# Patient Record
Sex: FEMALE | Race: WHITE | Employment: OTHER | URBAN - METROPOLITAN AREA
[De-identification: names, ages, dates, MRNs, and addresses within clinical notes are randomized per-mention and may not be internally consistent; named-entity substitution may affect disease eponyms.]

---

## 2017-01-01 ENCOUNTER — APPOINTMENT (OUTPATIENT)
Dept: ULTRASOUND IMAGING | Facility: HOSPITAL | Age: 82
DRG: 280 | End: 2017-01-01
Attending: INTERNAL MEDICINE
Payer: MEDICARE

## 2017-01-01 ENCOUNTER — APPOINTMENT (OUTPATIENT)
Dept: CV DIAGNOSTICS | Facility: HOSPITAL | Age: 82
DRG: 872 | End: 2017-01-01
Attending: INTERNAL MEDICINE
Payer: MEDICARE

## 2017-01-01 ENCOUNTER — APPOINTMENT (OUTPATIENT)
Dept: GENERAL RADIOLOGY | Facility: HOSPITAL | Age: 82
DRG: 872 | End: 2017-01-01
Attending: INTERNAL MEDICINE
Payer: MEDICARE

## 2017-01-01 ENCOUNTER — APPOINTMENT (OUTPATIENT)
Dept: GENERAL RADIOLOGY | Facility: HOSPITAL | Age: 82
DRG: 280 | End: 2017-01-01
Attending: INTERNAL MEDICINE
Payer: MEDICARE

## 2017-01-01 ENCOUNTER — APPOINTMENT (OUTPATIENT)
Dept: ULTRASOUND IMAGING | Facility: HOSPITAL | Age: 82
DRG: 314 | End: 2017-01-01
Attending: INTERNAL MEDICINE
Payer: MEDICARE

## 2017-01-01 ENCOUNTER — APPOINTMENT (OUTPATIENT)
Dept: CT IMAGING | Facility: HOSPITAL | Age: 82
DRG: 314 | End: 2017-01-01
Attending: EMERGENCY MEDICINE
Payer: MEDICARE

## 2017-01-01 ENCOUNTER — LAB REQUISITION (OUTPATIENT)
Dept: LAB | Facility: HOSPITAL | Age: 82
DRG: 280 | End: 2017-01-01
Payer: MEDICARE

## 2017-01-01 ENCOUNTER — APPOINTMENT (OUTPATIENT)
Dept: GENERAL RADIOLOGY | Facility: HOSPITAL | Age: 82
DRG: 314 | End: 2017-01-01
Attending: EMERGENCY MEDICINE
Payer: MEDICARE

## 2017-01-01 ENCOUNTER — APPOINTMENT (OUTPATIENT)
Dept: ULTRASOUND IMAGING | Facility: HOSPITAL | Age: 82
DRG: 872 | End: 2017-01-01
Attending: PHYSICIAN ASSISTANT
Payer: MEDICARE

## 2017-01-01 ENCOUNTER — LAB REQUISITION (OUTPATIENT)
Dept: LAB | Facility: HOSPITAL | Age: 82
DRG: 872 | End: 2017-01-01
Attending: INTERNAL MEDICINE
Payer: MEDICARE

## 2017-01-01 ENCOUNTER — HOSPITAL ENCOUNTER (INPATIENT)
Facility: HOSPITAL | Age: 82
LOS: 8 days | Discharge: NURSING, REHAB OR LONG-TERM CARE | DRG: 872 | End: 2017-01-01
Attending: EMERGENCY MEDICINE | Admitting: INTERNAL MEDICINE
Payer: MEDICARE

## 2017-01-01 ENCOUNTER — SNF VISIT (OUTPATIENT)
Dept: INTERNAL MEDICINE CLINIC | Age: 82
End: 2017-01-01

## 2017-01-01 ENCOUNTER — APPOINTMENT (OUTPATIENT)
Dept: GENERAL RADIOLOGY | Facility: HOSPITAL | Age: 82
DRG: 872 | End: 2017-01-01
Attending: HOSPITALIST
Payer: MEDICARE

## 2017-01-01 ENCOUNTER — APPOINTMENT (OUTPATIENT)
Dept: GENERAL RADIOLOGY | Facility: HOSPITAL | Age: 82
DRG: 280 | End: 2017-01-01
Payer: MEDICARE

## 2017-01-01 ENCOUNTER — APPOINTMENT (OUTPATIENT)
Dept: GENERAL RADIOLOGY | Facility: HOSPITAL | Age: 82
DRG: 280 | End: 2017-01-01
Attending: RADIOLOGY
Payer: MEDICARE

## 2017-01-01 ENCOUNTER — HOSPITAL ENCOUNTER (INPATIENT)
Facility: HOSPITAL | Age: 82
LOS: 7 days | Discharge: NURSING, REHAB OR LONG-TERM CARE | DRG: 314 | End: 2017-01-01
Attending: EMERGENCY MEDICINE | Admitting: HOSPITALIST
Payer: MEDICARE

## 2017-01-01 ENCOUNTER — HOSPITAL ENCOUNTER (INPATIENT)
Facility: HOSPITAL | Age: 82
LOS: 6 days | Discharge: NURSING, REHAB OR LONG-TERM CARE | DRG: 280 | End: 2017-01-01
Attending: INTERNAL MEDICINE | Admitting: INTERNAL MEDICINE
Payer: MEDICARE

## 2017-01-01 ENCOUNTER — APPOINTMENT (OUTPATIENT)
Dept: GENERAL RADIOLOGY | Facility: HOSPITAL | Age: 82
DRG: 872 | End: 2017-01-01
Attending: EMERGENCY MEDICINE
Payer: MEDICARE

## 2017-01-01 ENCOUNTER — APPOINTMENT (OUTPATIENT)
Dept: ULTRASOUND IMAGING | Facility: HOSPITAL | Age: 82
DRG: 872 | End: 2017-01-01
Attending: INTERNAL MEDICINE
Payer: MEDICARE

## 2017-01-01 ENCOUNTER — APPOINTMENT (OUTPATIENT)
Dept: CV DIAGNOSTICS | Facility: HOSPITAL | Age: 82
DRG: 314 | End: 2017-01-01
Attending: INTERNAL MEDICINE
Payer: MEDICARE

## 2017-01-01 ENCOUNTER — APPOINTMENT (OUTPATIENT)
Dept: CT IMAGING | Facility: HOSPITAL | Age: 82
DRG: 872 | End: 2017-01-01
Attending: EMERGENCY MEDICINE
Payer: MEDICARE

## 2017-01-01 VITALS
WEIGHT: 108.69 LBS | HEART RATE: 75 BPM | DIASTOLIC BLOOD PRESSURE: 75 MMHG | RESPIRATION RATE: 22 BRPM | TEMPERATURE: 98 F | SYSTOLIC BLOOD PRESSURE: 145 MMHG | OXYGEN SATURATION: 97 % | BODY MASS INDEX: 20 KG/M2

## 2017-01-01 VITALS
DIASTOLIC BLOOD PRESSURE: 67 MMHG | OXYGEN SATURATION: 96 % | TEMPERATURE: 97 F | RESPIRATION RATE: 22 BRPM | HEART RATE: 86 BPM | SYSTOLIC BLOOD PRESSURE: 148 MMHG

## 2017-01-01 VITALS
DIASTOLIC BLOOD PRESSURE: 82 MMHG | OXYGEN SATURATION: 95 % | SYSTOLIC BLOOD PRESSURE: 155 MMHG | RESPIRATION RATE: 20 BRPM | HEART RATE: 98 BPM | TEMPERATURE: 98 F

## 2017-01-01 VITALS
OXYGEN SATURATION: 97 % | BODY MASS INDEX: 20.94 KG/M2 | DIASTOLIC BLOOD PRESSURE: 82 MMHG | TEMPERATURE: 98 F | HEIGHT: 62 IN | SYSTOLIC BLOOD PRESSURE: 149 MMHG | WEIGHT: 113.81 LBS | RESPIRATION RATE: 18 BRPM | HEART RATE: 80 BPM

## 2017-01-01 VITALS
DIASTOLIC BLOOD PRESSURE: 52 MMHG | SYSTOLIC BLOOD PRESSURE: 87 MMHG | RESPIRATION RATE: 24 BRPM | OXYGEN SATURATION: 92 % | HEART RATE: 100 BPM

## 2017-01-01 VITALS
OXYGEN SATURATION: 98 % | WEIGHT: 108.63 LBS | BODY MASS INDEX: 20 KG/M2 | TEMPERATURE: 98 F | HEART RATE: 85 BPM | RESPIRATION RATE: 20 BRPM | DIASTOLIC BLOOD PRESSURE: 31 MMHG | SYSTOLIC BLOOD PRESSURE: 96 MMHG

## 2017-01-01 VITALS
WEIGHT: 120.38 LBS | TEMPERATURE: 98 F | RESPIRATION RATE: 18 BRPM | HEART RATE: 84 BPM | DIASTOLIC BLOOD PRESSURE: 67 MMHG | BODY MASS INDEX: 22.15 KG/M2 | SYSTOLIC BLOOD PRESSURE: 155 MMHG | HEIGHT: 62 IN | OXYGEN SATURATION: 99 %

## 2017-01-01 DIAGNOSIS — F03.91 DEMENTIA WITH BEHAVIORAL DISTURBANCE (HCC): ICD-10-CM

## 2017-01-01 DIAGNOSIS — I50.9 ACUTE ON CHRONIC CONGESTIVE HEART FAILURE, UNSPECIFIED CONGESTIVE HEART FAILURE TYPE: ICD-10-CM

## 2017-01-01 DIAGNOSIS — E87.6 HYPOKALEMIA: ICD-10-CM

## 2017-01-01 DIAGNOSIS — R06.02 SHORTNESS OF BREATH: ICD-10-CM

## 2017-01-01 DIAGNOSIS — R50.9 FEVER, UNSPECIFIED FEVER CAUSE: ICD-10-CM

## 2017-01-01 DIAGNOSIS — R13.19 OTHER DYSPHAGIA: ICD-10-CM

## 2017-01-01 DIAGNOSIS — I10 ESSENTIAL HYPERTENSION WITH GOAL BLOOD PRESSURE LESS THAN 140/90: ICD-10-CM

## 2017-01-01 DIAGNOSIS — I10 ESSENTIAL HYPERTENSION: ICD-10-CM

## 2017-01-01 DIAGNOSIS — I25.10 CORONARY ARTERY DISEASE INVOLVING NATIVE CORONARY ARTERY OF NATIVE HEART WITHOUT ANGINA PECTORIS: ICD-10-CM

## 2017-01-01 DIAGNOSIS — F03.90 DEMENTIA WITHOUT BEHAVIORAL DISTURBANCE, UNSPECIFIED DEMENTIA TYPE (HCC): ICD-10-CM

## 2017-01-01 DIAGNOSIS — I95.9 HYPOTENSION, UNSPECIFIED HYPOTENSION TYPE: ICD-10-CM

## 2017-01-01 DIAGNOSIS — I50.9 ACUTE ON CHRONIC CONGESTIVE HEART FAILURE, UNSPECIFIED CONGESTIVE HEART FAILURE TYPE: Primary | ICD-10-CM

## 2017-01-01 DIAGNOSIS — I10 ESSENTIAL (PRIMARY) HYPERTENSION: ICD-10-CM

## 2017-01-01 DIAGNOSIS — E78.2 HYPERLIPIDEMIA, MIXED: ICD-10-CM

## 2017-01-01 DIAGNOSIS — R78.81 BACTEREMIA: ICD-10-CM

## 2017-01-01 DIAGNOSIS — R13.10 DYSPHAGIA, UNSPECIFIED TYPE: ICD-10-CM

## 2017-01-01 DIAGNOSIS — R45.1 AGITATION: ICD-10-CM

## 2017-01-01 DIAGNOSIS — F03.91 DEMENTIA WITH BEHAVIORAL DISTURBANCE, UNSPECIFIED DEMENTIA TYPE (HCC): ICD-10-CM

## 2017-01-01 DIAGNOSIS — R06.00 DYSPNEA, UNSPECIFIED TYPE: ICD-10-CM

## 2017-01-01 DIAGNOSIS — E83.42 HYPOMAGNESEMIA: ICD-10-CM

## 2017-01-01 DIAGNOSIS — E78.5 HYPERLIPIDEMIA: ICD-10-CM

## 2017-01-01 DIAGNOSIS — R94.31 ABNORMAL EKG: ICD-10-CM

## 2017-01-01 DIAGNOSIS — B95.5 BACTEREMIA DUE TO STREPTOCOCCUS: ICD-10-CM

## 2017-01-01 DIAGNOSIS — R77.8 ELEVATED TROPONIN: ICD-10-CM

## 2017-01-01 DIAGNOSIS — R53.1 WEAKNESS: ICD-10-CM

## 2017-01-01 DIAGNOSIS — Z95.0 PACEMAKER: ICD-10-CM

## 2017-01-01 DIAGNOSIS — R06.03 RESPIRATORY DISTRESS: ICD-10-CM

## 2017-01-01 DIAGNOSIS — R78.81 BACTEREMIA DUE TO STREPTOCOCCUS: ICD-10-CM

## 2017-01-01 DIAGNOSIS — R40.0 SOMNOLENCE: Primary | ICD-10-CM

## 2017-01-01 DIAGNOSIS — J96.00 ACUTE RESPIRATORY FAILURE, UNSPECIFIED WHETHER WITH HYPOXIA OR HYPERCAPNIA (HCC): Primary | ICD-10-CM

## 2017-01-01 DIAGNOSIS — Z91.81 AT RISK FOR FALLING: ICD-10-CM

## 2017-01-01 DIAGNOSIS — R06.89 BREATHING DIFFICULTY: Primary | ICD-10-CM

## 2017-01-01 DIAGNOSIS — E87.1 HYPONATREMIA: ICD-10-CM

## 2017-01-01 DIAGNOSIS — R07.89 CHEST TIGHTNESS: Primary | ICD-10-CM

## 2017-01-01 DIAGNOSIS — R60.0 LOCALIZED EDEMA: ICD-10-CM

## 2017-01-01 DIAGNOSIS — E86.0 DEHYDRATION: ICD-10-CM

## 2017-01-01 DIAGNOSIS — I35.0 AORTIC VALVE STENOSIS, UNSPECIFIED ETIOLOGY: ICD-10-CM

## 2017-01-01 DIAGNOSIS — R63.0 ANOREXIA: ICD-10-CM

## 2017-01-01 DIAGNOSIS — E55.9 VITAMIN D DEFICIENCY: ICD-10-CM

## 2017-01-01 DIAGNOSIS — I95.9 HYPOTENSION, UNSPECIFIED HYPOTENSION TYPE: Primary | ICD-10-CM

## 2017-01-01 DIAGNOSIS — Z95.0 PRESENCE OF CARDIAC PACEMAKER: ICD-10-CM

## 2017-01-01 DIAGNOSIS — R60.0 PEDAL EDEMA: ICD-10-CM

## 2017-01-01 LAB
ALBUMIN SERPL-MCNC: 2.1 G/DL (ref 3.5–4.8)
ALBUMIN SERPL-MCNC: 2.1 G/DL (ref 3.5–4.8)
ALBUMIN SERPL-MCNC: 2.2 G/DL (ref 3.5–4.8)
ALBUMIN SERPL-MCNC: 2.8 G/DL (ref 3.5–4.8)
ALLENS TEST: POSITIVE
ALLENS TEST: POSITIVE
ALP LIVER SERPL-CCNC: 71 U/L (ref 55–142)
ALP LIVER SERPL-CCNC: 76 U/L (ref 55–142)
ALP LIVER SERPL-CCNC: 87 U/L (ref 55–142)
ALP LIVER SERPL-CCNC: 95 U/L (ref 55–142)
ALT SERPL-CCNC: 117 U/L (ref 14–54)
ALT SERPL-CCNC: 15 U/L (ref 14–54)
ALT SERPL-CCNC: 38 U/L (ref 14–54)
ALT SERPL-CCNC: 63 U/L (ref 14–54)
APTT PPP: 23.3 SECONDS (ref 25–34)
ARTERIAL BLD GAS O2 SATURATION: 96 % (ref 92–100)
ARTERIAL BLD GAS O2 SATURATION: 96 % (ref 92–100)
ARTERIAL BLOOD GAS BASE EXCESS: -2.6
ARTERIAL BLOOD GAS BASE EXCESS: 0.2
ARTERIAL BLOOD GAS HCO3: 21.8 MEQ/L (ref 22–26)
ARTERIAL BLOOD GAS HCO3: 23.2 MEQ/L (ref 22–26)
ARTERIAL BLOOD GAS PCO2: 31 MM HG (ref 35–45)
ARTERIAL BLOOD GAS PCO2: 35 MM HG (ref 35–45)
ARTERIAL BLOOD GAS PH: 7.41 (ref 7.35–7.45)
ARTERIAL BLOOD GAS PH: 7.5 (ref 7.35–7.45)
ARTERIAL BLOOD GAS PO2: 110 MM HG (ref 80–105)
ARTERIAL BLOOD GAS PO2: 88 MM HG (ref 80–105)
AST SERPL-CCNC: 18 U/L (ref 15–41)
AST SERPL-CCNC: 20 U/L (ref 15–41)
AST SERPL-CCNC: 27 U/L (ref 15–41)
AST SERPL-CCNC: 89 U/L (ref 15–41)
ATRIAL RATE: 88 BPM
BASOPHIL PLEURAL FLUID: 0 %
BASOPHILS # BLD AUTO: 0.02 X10(3) UL (ref 0–0.1)
BASOPHILS # BLD AUTO: 0.02 X10(3) UL (ref 0–0.1)
BASOPHILS # BLD AUTO: 0.03 X10(3) UL (ref 0–0.1)
BASOPHILS # BLD AUTO: 0.05 X10(3) UL (ref 0–0.1)
BASOPHILS NFR BLD AUTO: 0.1 %
BASOPHILS NFR BLD AUTO: 0.2 %
BASOPHILS NFR BLD AUTO: 0.2 %
BASOPHILS NFR BLD AUTO: 0.3 %
BETA NATRIURETIC PEPTIDE: 2375 PG/ML (ref 2–99)
BILIRUB SERPL-MCNC: 0.3 MG/DL (ref 0.1–2)
BILIRUB SERPL-MCNC: 0.4 MG/DL (ref 0.1–2)
BILIRUB SERPL-MCNC: 0.4 MG/DL (ref 0.1–2)
BILIRUB SERPL-MCNC: 1 MG/DL (ref 0.1–2)
BILIRUB UR QL STRIP.AUTO: NEGATIVE
BUN BLD-MCNC: 10 MG/DL (ref 8–20)
BUN BLD-MCNC: 13 MG/DL (ref 8–20)
BUN BLD-MCNC: 20 MG/DL (ref 8–20)
BUN BLD-MCNC: 22 MG/DL (ref 8–20)
BUN BLD-MCNC: 23 MG/DL (ref 8–20)
BUN BLD-MCNC: 24 MG/DL (ref 8–20)
BUN BLD-MCNC: 25 MG/DL (ref 8–20)
BUN BLD-MCNC: 27 MG/DL (ref 8–20)
BUN BLD-MCNC: 30 MG/DL (ref 8–20)
BUN BLD-MCNC: 31 MG/DL (ref 8–20)
CALCIUM BLD-MCNC: 7.9 MG/DL (ref 8.3–10.3)
CALCIUM BLD-MCNC: 8 MG/DL (ref 8.3–10.3)
CALCIUM BLD-MCNC: 8.3 MG/DL (ref 8.3–10.3)
CALCIUM BLD-MCNC: 8.3 MG/DL (ref 8.3–10.3)
CALCIUM BLD-MCNC: 8.4 MG/DL (ref 8.3–10.3)
CALCIUM BLD-MCNC: 8.5 MG/DL (ref 8.3–10.3)
CALCIUM BLD-MCNC: 8.5 MG/DL (ref 8.3–10.3)
CALCIUM BLD-MCNC: 8.6 MG/DL (ref 8.3–10.3)
CALCIUM BLD-MCNC: 8.7 MG/DL (ref 8.3–10.3)
CALCIUM BLD-MCNC: 8.7 MG/DL (ref 8.3–10.3)
CALCIUM BLD-MCNC: 8.9 MG/DL (ref 8.3–10.3)
CALCULATED O2 SATURATION: 98 % (ref 92–100)
CALCULATED O2 SATURATION: 98 % (ref 92–100)
CARBOXYHEMOGLOBIN: 1.4 % SAT (ref 0–3)
CARBOXYHEMOGLOBIN: 1.6 % SAT (ref 0–3)
CHLORIDE: 100 MMOL/L (ref 101–111)
CHLORIDE: 100 MMOL/L (ref 101–111)
CHLORIDE: 102 MMOL/L (ref 101–111)
CHLORIDE: 103 MMOL/L (ref 101–111)
CHLORIDE: 104 MMOL/L (ref 101–111)
CHLORIDE: 106 MMOL/L (ref 101–111)
CHLORIDE: 107 MMOL/L (ref 101–111)
CHLORIDE: 107 MMOL/L (ref 101–111)
CLARITY UR REFRACT.AUTO: CLEAR
CO2: 21 MMOL/L (ref 22–32)
CO2: 23 MMOL/L (ref 22–32)
CO2: 24 MMOL/L (ref 22–32)
CO2: 25 MMOL/L (ref 22–32)
CO2: 29 MMOL/L (ref 22–32)
CO2: 32 MMOL/L (ref 22–32)
CO2: 32 MMOL/L (ref 22–32)
CO2: 33 MMOL/L (ref 22–32)
CO2: 33 MMOL/L (ref 22–32)
COLOR UR AUTO: YELLOW
CREAT BLD-MCNC: 0.5 MG/DL (ref 0.55–1.02)
CREAT BLD-MCNC: 0.57 MG/DL (ref 0.55–1.02)
CREAT BLD-MCNC: 0.6 MG/DL (ref 0.55–1.02)
CREAT BLD-MCNC: 0.72 MG/DL (ref 0.55–1.02)
CREAT BLD-MCNC: 0.8 MG/DL (ref 0.55–1.02)
CREAT BLD-MCNC: 0.86 MG/DL (ref 0.55–1.02)
CREAT BLD-MCNC: 0.88 MG/DL (ref 0.55–1.02)
CREAT BLD-MCNC: 0.89 MG/DL (ref 0.55–1.02)
CREAT BLD-MCNC: 0.89 MG/DL (ref 0.55–1.02)
CREAT BLD-MCNC: 0.92 MG/DL (ref 0.55–1.02)
CREAT BLD-MCNC: 0.95 MG/DL (ref 0.55–1.02)
CREAT BLD-MCNC: 0.96 MG/DL (ref 0.55–1.02)
CREAT BLD-MCNC: 0.96 MG/DL (ref 0.55–1.02)
EOSINOPHIL # BLD AUTO: 0 X10(3) UL (ref 0–0.3)
EOSINOPHIL # BLD AUTO: 0 X10(3) UL (ref 0–0.3)
EOSINOPHIL # BLD AUTO: 0.01 X10(3) UL (ref 0–0.3)
EOSINOPHIL # BLD AUTO: 0.08 X10(3) UL (ref 0–0.3)
EOSINOPHIL NFR BLD AUTO: 0 %
EOSINOPHIL NFR BLD AUTO: 0 %
EOSINOPHIL NFR BLD AUTO: 0.1 %
EOSINOPHIL NFR BLD AUTO: 0.5 %
EOSINOPHIL PLEURAL FLUID: 0 %
ERYTHROCYTE [DISTWIDTH] IN BLOOD BY AUTOMATED COUNT: 13 % (ref 11.5–16)
ERYTHROCYTE [DISTWIDTH] IN BLOOD BY AUTOMATED COUNT: 13.1 % (ref 11.5–16)
ERYTHROCYTE [DISTWIDTH] IN BLOOD BY AUTOMATED COUNT: 13.2 % (ref 11.5–16)
ERYTHROCYTE [DISTWIDTH] IN BLOOD BY AUTOMATED COUNT: 14 % (ref 11.5–16)
ERYTHROCYTE [DISTWIDTH] IN BLOOD BY AUTOMATED COUNT: 14.2 % (ref 11.5–16)
ERYTHROCYTE [DISTWIDTH] IN BLOOD BY AUTOMATED COUNT: 14.4 % (ref 11.5–16)
EXPIRATORY PRESSURE: 5 CM H2O
EXPIRATORY PRESSURE: 6 CM H2O
FIO2: 40 %
GENTAMICIN PEAK: 3.3 UG/ML (ref 5–10)
GLUCOSE BLD-MCNC: 100 MG/DL (ref 70–99)
GLUCOSE BLD-MCNC: 119 MG/DL (ref 70–99)
GLUCOSE BLD-MCNC: 124 MG/DL (ref 70–99)
GLUCOSE BLD-MCNC: 135 MG/DL (ref 70–99)
GLUCOSE BLD-MCNC: 160 MG/DL (ref 70–99)
GLUCOSE BLD-MCNC: 172 MG/DL (ref 70–99)
GLUCOSE BLD-MCNC: 87 MG/DL (ref 70–99)
GLUCOSE BLD-MCNC: 93 MG/DL (ref 70–99)
GLUCOSE BLD-MCNC: 94 MG/DL (ref 70–99)
GLUCOSE BLD-MCNC: 95 MG/DL (ref 70–99)
GLUCOSE BLD-MCNC: 96 MG/DL (ref 70–99)
GLUCOSE BLD-MCNC: 98 MG/DL (ref 70–99)
GLUCOSE BLD-MCNC: 99 MG/DL (ref 70–99)
GLUCOSE PLEURAL FLUID: 128 MG/DL
GLUCOSE PLEURAL FLUID: 129 MG/DL
GLUCOSE UR STRIP.AUTO-MCNC: NEGATIVE MG/DL
HAV IGM SER QL: 2 MG/DL (ref 1.7–3)
HAV IGM SER QL: 2.1 MG/DL (ref 1.7–3)
HCT VFR BLD AUTO: 29.8 % (ref 34–50)
HCT VFR BLD AUTO: 31.5 % (ref 34–50)
HCT VFR BLD AUTO: 31.8 % (ref 34–50)
HCT VFR BLD AUTO: 32.2 % (ref 34–50)
HCT VFR BLD AUTO: 36.1 % (ref 34–50)
HCT VFR BLD AUTO: 36.9 % (ref 34–50)
HGB BLD-MCNC: 10.3 G/DL (ref 12–16)
HGB BLD-MCNC: 10.5 G/DL (ref 12–16)
HGB BLD-MCNC: 10.6 G/DL (ref 12–16)
HGB BLD-MCNC: 11.7 G/DL (ref 12–16)
HGB BLD-MCNC: 12.5 G/DL (ref 12–16)
HGB BLD-MCNC: 9.8 G/DL (ref 12–16)
IMMATURE GRANULOCYTE COUNT: 0.06 X10(3) UL (ref 0–1)
IMMATURE GRANULOCYTE COUNT: 0.09 X10(3) UL (ref 0–1)
IMMATURE GRANULOCYTE COUNT: 0.1 X10(3) UL (ref 0–1)
IMMATURE GRANULOCYTE COUNT: 0.15 X10(3) UL (ref 0–1)
IMMATURE GRANULOCYTE RATIO %: 0.5 %
IMMATURE GRANULOCYTE RATIO %: 0.5 %
IMMATURE GRANULOCYTE RATIO %: 0.7 %
IMMATURE GRANULOCYTE RATIO %: 0.9 %
INR BLD: 1.26 (ref 0.89–1.11)
INR BLD: 1.44 (ref 0.89–1.11)
INSP PRESSURE: 12 CM H2O
INSP PRESSURE: 14 CM H2O
IONIZED CALCIUM: 1.13 MMOL/L (ref 1.12–1.32)
KETONES UR STRIP.AUTO-MCNC: NEGATIVE MG/DL
L/M: 50 L/MIN
LACTIC ACID ARTERIAL: 1.5 MMOL/L (ref 0.5–2)
LACTIC ACID: 1.5 MMOL/L (ref 0.5–2)
LDH PLEURAL FLUID: 80 U/L
LDH PLEURAL FLUID: 99 U/L
LEUKOCYTE ESTERASE UR QL STRIP.AUTO: NEGATIVE
LYMPHOCYTE PLEURAL FLUID: 48 %
LYMPHOCYTES # BLD AUTO: 0.86 X10(3) UL (ref 0.9–4)
LYMPHOCYTES # BLD AUTO: 0.98 X10(3) UL (ref 0.9–4)
LYMPHOCYTES # BLD AUTO: 1.01 X10(3) UL (ref 0.9–4)
LYMPHOCYTES # BLD AUTO: 1.88 X10(3) UL (ref 0.9–4)
LYMPHOCYTES NFR BLD AUTO: 10.8 %
LYMPHOCYTES NFR BLD AUTO: 5.7 %
LYMPHOCYTES NFR BLD AUTO: 6.8 %
LYMPHOCYTES NFR BLD AUTO: 7.1 %
M PROTEIN MFR SERPL ELPH: 5.9 G/DL (ref 6.1–8.3)
M PROTEIN MFR SERPL ELPH: 6.1 G/DL (ref 6.1–8.3)
M PROTEIN MFR SERPL ELPH: 6.7 G/DL (ref 6.1–8.3)
M PROTEIN MFR SERPL ELPH: 6.8 G/DL (ref 6.1–8.3)
MCH RBC QN AUTO: 27.2 PG (ref 27–33.2)
MCH RBC QN AUTO: 27.5 PG (ref 27–33.2)
MCH RBC QN AUTO: 27.6 PG (ref 27–33.2)
MCH RBC QN AUTO: 28.8 PG (ref 27–33.2)
MCH RBC QN AUTO: 29 PG (ref 27–33.2)
MCH RBC QN AUTO: 29.3 PG (ref 27–33.2)
MCHC RBC AUTO-ENTMCNC: 32 G/DL (ref 31–37)
MCHC RBC AUTO-ENTMCNC: 32.4 G/DL (ref 31–37)
MCHC RBC AUTO-ENTMCNC: 32.9 G/DL (ref 31–37)
MCHC RBC AUTO-ENTMCNC: 33.3 G/DL (ref 31–37)
MCHC RBC AUTO-ENTMCNC: 33.3 G/DL (ref 31–37)
MCHC RBC AUTO-ENTMCNC: 33.9 G/DL (ref 31–37)
MCV RBC AUTO: 83.7 FL (ref 81–100)
MCV RBC AUTO: 85.1 FL (ref 81–100)
MCV RBC AUTO: 85.2 FL (ref 81–100)
MCV RBC AUTO: 86.5 FL (ref 81–100)
MCV RBC AUTO: 86.6 FL (ref 81–100)
MCV RBC AUTO: 86.9 FL (ref 81–100)
MESOTHELIAL PLEURAL FLUID: 2 %
METHEMOGLOBIN: 0.5 % SAT (ref 0.4–1.5)
METHEMOGLOBIN: 0.5 % SAT (ref 0.4–1.5)
MONO/MAC/HISTIO PLEURAL FLUID: 21 %
MONOCYTES # BLD AUTO: 1.16 X10(3) UL (ref 0.1–0.6)
MONOCYTES # BLD AUTO: 1.18 X10(3) UL (ref 0.1–0.6)
MONOCYTES # BLD AUTO: 1.37 X10(3) UL (ref 0.1–0.6)
MONOCYTES # BLD AUTO: 1.52 X10(3) UL (ref 0.1–0.6)
MONOCYTES NFR BLD AUTO: 7.9 %
MONOCYTES NFR BLD AUTO: 8 %
MONOCYTES NFR BLD AUTO: 8.8 %
MONOCYTES NFR BLD AUTO: 9.6 %
NEUTROPHIL ABS PRELIM: 12.6 X10 (3) UL (ref 1.3–6.7)
NEUTROPHIL ABS PRELIM: 13.69 X10 (3) UL (ref 1.3–6.7)
NEUTROPHIL ABS PRELIM: 14.58 X10 (3) UL (ref 1.3–6.7)
NEUTROPHIL ABS PRELIM: 9.94 X10 (3) UL (ref 1.3–6.7)
NEUTROPHIL PLEURAL FLUID: 29 %
NEUTROPHILS # BLD AUTO: 12.6 X10(3) UL (ref 1.3–6.7)
NEUTROPHILS # BLD AUTO: 13.69 X10(3) UL (ref 1.3–6.7)
NEUTROPHILS # BLD AUTO: 14.58 X10(3) UL (ref 1.3–6.7)
NEUTROPHILS # BLD AUTO: 9.94 X10(3) UL (ref 1.3–6.7)
NEUTROPHILS NFR BLD AUTO: 78.7 %
NEUTROPHILS NFR BLD AUTO: 82.5 %
NEUTROPHILS NFR BLD AUTO: 84.5 %
NEUTROPHILS NFR BLD AUTO: 85.6 %
NITRITE UR QL STRIP.AUTO: NEGATIVE
NON GYNE INTERPRETATION: NEGATIVE
P AXIS: -26 DEGREES
P-R INTERVAL: 132 MS
PATIENT TEMPERATURE: 97 F
PATIENT TEMPERATURE: 97.2 F
PH UR STRIP.AUTO: 6 [PH] (ref 4.5–8)
PLATELET # BLD AUTO: 178 10(3)UL (ref 150–450)
PLATELET # BLD AUTO: 188 10(3)UL (ref 150–450)
PLATELET # BLD AUTO: 201 10(3)UL (ref 150–450)
PLATELET # BLD AUTO: 294 10(3)UL (ref 150–450)
PLATELET # BLD AUTO: 331 10(3)UL (ref 150–450)
PLATELET # BLD AUTO: 431 10(3)UL (ref 150–450)
PLEURAL FLUID PH: 7.51 (ref 7.2–7.5)
PLEURAL FLUID PH: 7.53 (ref 7.2–7.5)
POTASSIUM BLOOD GAS: 3.4 MMOL/L (ref 3.6–5.1)
POTASSIUM SERPL-SCNC: 3.2 MMOL/L (ref 3.6–5.1)
POTASSIUM SERPL-SCNC: 3.3 MMOL/L (ref 3.6–5.1)
POTASSIUM SERPL-SCNC: 3.4 MMOL/L (ref 3.6–5.1)
POTASSIUM SERPL-SCNC: 3.6 MMOL/L (ref 3.6–5.1)
POTASSIUM SERPL-SCNC: 3.7 MMOL/L (ref 3.6–5.1)
POTASSIUM SERPL-SCNC: 3.7 MMOL/L (ref 3.6–5.1)
POTASSIUM SERPL-SCNC: 4.1 MMOL/L (ref 3.6–5.1)
POTASSIUM SERPL-SCNC: 4.1 MMOL/L (ref 3.6–5.1)
POTASSIUM SERPL-SCNC: 4.2 MMOL/L (ref 3.6–5.1)
POTASSIUM SERPL-SCNC: 4.2 MMOL/L (ref 3.6–5.1)
POTASSIUM SERPL-SCNC: 4.3 MMOL/L (ref 3.6–5.1)
POTASSIUM SERPL-SCNC: 4.4 MMOL/L (ref 3.6–5.1)
POTASSIUM SERPL-SCNC: 4.5 MMOL/L (ref 3.6–5.1)
PRO-BETA NATRIURETIC PEPTIDE: ABNORMAL PG/ML (ref ?–450)
PROT UR STRIP.AUTO-MCNC: NEGATIVE MG/DL
PSA SERPL DL<=0.01 NG/ML-MCNC: 15.9 SECONDS (ref 12–14.3)
PSA SERPL DL<=0.01 NG/ML-MCNC: 17.7 SECONDS (ref 12–14.3)
Q-T INTERVAL: 548 MS
QRS DURATION: 170 MS
QTC CALCULATION (BEZET): 663 MS
R AXIS: -83 DEGREES
RBC # BLD AUTO: 3.56 X10(6)UL (ref 3.8–5.1)
RBC # BLD AUTO: 3.64 X10(6)UL (ref 3.8–5.1)
RBC # BLD AUTO: 3.66 X10(6)UL (ref 3.8–5.1)
RBC # BLD AUTO: 3.78 X10(6)UL (ref 3.8–5.1)
RBC # BLD AUTO: 4.24 X10(6)UL (ref 3.8–5.1)
RBC # BLD AUTO: 4.26 X10(6)UL (ref 3.8–5.1)
RBC PLEURAL FLUID: <3000 /MM3
RED CELL DISTRIBUTION WIDTH-SD: 41 FL (ref 35.1–46.3)
RED CELL DISTRIBUTION WIDTH-SD: 41.2 FL (ref 35.1–46.3)
RED CELL DISTRIBUTION WIDTH-SD: 41.4 FL (ref 35.1–46.3)
RED CELL DISTRIBUTION WIDTH-SD: 42.8 FL (ref 35.1–46.3)
RED CELL DISTRIBUTION WIDTH-SD: 43.8 FL (ref 35.1–46.3)
RED CELL DISTRIBUTION WIDTH-SD: 44.2 FL (ref 35.1–46.3)
RESPIRATORY PANEL NEG:: NEGATIVE
SODIUM BLOOD GAS: 133 MMOL/L (ref 136–144)
SODIUM SERPL-SCNC: 135 MMOL/L (ref 136–144)
SODIUM SERPL-SCNC: 137 MMOL/L (ref 136–144)
SODIUM SERPL-SCNC: 137 MMOL/L (ref 136–144)
SODIUM SERPL-SCNC: 139 MMOL/L (ref 136–144)
SODIUM SERPL-SCNC: 140 MMOL/L (ref 136–144)
SODIUM SERPL-SCNC: 141 MMOL/L (ref 136–144)
SODIUM SERPL-SCNC: 141 MMOL/L (ref 136–144)
SP GR UR STRIP.AUTO: 1.01 (ref 1–1.03)
T AXIS: 109 DEGREES
TOTAL CELLS COUNTED: 100
TOTAL HEMOGLOBIN: 10.7 G/DL (ref 11.7–16)
TOTAL HEMOGLOBIN: 11.4 G/DL (ref 11.7–16)
TOTAL PROTEIN PLEURAL FLUID: 1.8 G/DL
TOTAL PROTEIN PLEURAL FLUID: 1.8 G/DL
TROPONIN: 1.02 NG/ML (ref ?–0.05)
TROPONIN: 1.07 NG/ML (ref ?–0.05)
UROBILINOGEN UR STRIP.AUTO-MCNC: <2 MG/DL
VENT RATE: 12 /MIN
VENT RATE: 12 /MIN
VENTRICULAR RATE: 88 BPM
WBC # BLD AUTO: 10.1 X10(3) UL (ref 4–13)
WBC # BLD AUTO: 11 X10(3) UL (ref 4–13)
WBC # BLD AUTO: 12.1 X10(3) UL (ref 4–13)
WBC # BLD AUTO: 14.9 X10(3) UL (ref 4–13)
WBC # BLD AUTO: 17.1 X10(3) UL (ref 4–13)
WBC # BLD AUTO: 17.4 X10(3) UL (ref 4–13)
WBC PLEURAL FLUID: 484 /MM3

## 2017-01-01 PROCEDURE — 96365 THER/PROPH/DIAG IV INF INIT: CPT

## 2017-01-01 PROCEDURE — 93005 ELECTROCARDIOGRAM TRACING: CPT

## 2017-01-01 PROCEDURE — 82962 GLUCOSE BLOOD TEST: CPT

## 2017-01-01 PROCEDURE — 83880 ASSAY OF NATRIURETIC PEPTIDE: CPT | Performed by: NURSE PRACTITIONER

## 2017-01-01 PROCEDURE — 87150 DNA/RNA AMPLIFIED PROBE: CPT | Performed by: EMERGENCY MEDICINE

## 2017-01-01 PROCEDURE — 85025 COMPLETE CBC W/AUTO DIFF WBC: CPT | Performed by: INTERNAL MEDICINE

## 2017-01-01 PROCEDURE — 81001 URINALYSIS AUTO W/SCOPE: CPT | Performed by: INTERNAL MEDICINE

## 2017-01-01 PROCEDURE — 36600 WITHDRAWAL OF ARTERIAL BLOOD: CPT

## 2017-01-01 PROCEDURE — 92526 ORAL FUNCTION THERAPY: CPT

## 2017-01-01 PROCEDURE — 87040 BLOOD CULTURE FOR BACTERIA: CPT | Performed by: INTERNAL MEDICINE

## 2017-01-01 PROCEDURE — 83605 ASSAY OF LACTIC ACID: CPT

## 2017-01-01 PROCEDURE — 83735 ASSAY OF MAGNESIUM: CPT | Performed by: INTERNAL MEDICINE

## 2017-01-01 PROCEDURE — 97162 PT EVAL MOD COMPLEX 30 MIN: CPT

## 2017-01-01 PROCEDURE — 84157 ASSAY OF PROTEIN OTHER: CPT | Performed by: INTERNAL MEDICINE

## 2017-01-01 PROCEDURE — 83050 HGB METHEMOGLOBIN QUAN: CPT | Performed by: HOSPITALIST

## 2017-01-01 PROCEDURE — 84484 ASSAY OF TROPONIN QUANT: CPT | Performed by: HOSPITALIST

## 2017-01-01 PROCEDURE — 83605 ASSAY OF LACTIC ACID: CPT | Performed by: EMERGENCY MEDICINE

## 2017-01-01 PROCEDURE — 80048 BASIC METABOLIC PNL TOTAL CA: CPT | Performed by: INTERNAL MEDICINE

## 2017-01-01 PROCEDURE — 93010 ELECTROCARDIOGRAM REPORT: CPT

## 2017-01-01 PROCEDURE — 85025 COMPLETE CBC W/AUTO DIFF WBC: CPT

## 2017-01-01 PROCEDURE — 74230 X-RAY XM SWLNG FUNCJ C+: CPT | Performed by: INTERNAL MEDICINE

## 2017-01-01 PROCEDURE — 32555 ASPIRATE PLEURA W/ IMAGING: CPT | Performed by: INTERNAL MEDICINE

## 2017-01-01 PROCEDURE — 87040 BLOOD CULTURE FOR BACTERIA: CPT | Performed by: HOSPITALIST

## 2017-01-01 PROCEDURE — 71010 XR CHEST AP PORTABLE  (CPT=71010): CPT | Performed by: HOSPITALIST

## 2017-01-01 PROCEDURE — 84484 ASSAY OF TROPONIN QUANT: CPT

## 2017-01-01 PROCEDURE — 80053 COMPREHEN METABOLIC PANEL: CPT | Performed by: EMERGENCY MEDICINE

## 2017-01-01 PROCEDURE — 99285 EMERGENCY DEPT VISIT HI MDM: CPT

## 2017-01-01 PROCEDURE — 82945 GLUCOSE OTHER FLUID: CPT | Performed by: INTERNAL MEDICINE

## 2017-01-01 PROCEDURE — 94660 CPAP INITIATION&MGMT: CPT

## 2017-01-01 PROCEDURE — 85610 PROTHROMBIN TIME: CPT

## 2017-01-01 PROCEDURE — 87186 SC STD MICRODIL/AGAR DIL: CPT | Performed by: EMERGENCY MEDICINE

## 2017-01-01 PROCEDURE — 82375 ASSAY CARBOXYHB QUANT: CPT

## 2017-01-01 PROCEDURE — 80053 COMPREHEN METABOLIC PANEL: CPT | Performed by: NURSE PRACTITIONER

## 2017-01-01 PROCEDURE — 71020 XR CHEST PA + LAT CHEST (CPT=71020): CPT | Performed by: INTERNAL MEDICINE

## 2017-01-01 PROCEDURE — 84132 ASSAY OF SERUM POTASSIUM: CPT | Performed by: INTERNAL MEDICINE

## 2017-01-01 PROCEDURE — 02HV33Z INSERTION OF INFUSION DEVICE INTO SUPERIOR VENA CAVA, PERCUTANEOUS APPROACH: ICD-10-PCS | Performed by: INTERNAL MEDICINE

## 2017-01-01 PROCEDURE — 85025 COMPLETE CBC W/AUTO DIFF WBC: CPT | Performed by: HOSPITALIST

## 2017-01-01 PROCEDURE — 36569 INSJ PICC 5 YR+ W/O IMAGING: CPT

## 2017-01-01 PROCEDURE — 80048 BASIC METABOLIC PNL TOTAL CA: CPT | Performed by: HOSPITALIST

## 2017-01-01 PROCEDURE — B54MZZA ULTRASONOGRAPHY OF RIGHT UPPER EXTREMITY VEINS, GUIDANCE: ICD-10-PCS | Performed by: INTERNAL MEDICINE

## 2017-01-01 PROCEDURE — 80053 COMPREHEN METABOLIC PANEL: CPT

## 2017-01-01 PROCEDURE — 94640 AIRWAY INHALATION TREATMENT: CPT

## 2017-01-01 PROCEDURE — 83615 LACTATE (LD) (LDH) ENZYME: CPT | Performed by: INTERNAL MEDICINE

## 2017-01-01 PROCEDURE — 71010 XR CHEST AP PORTABLE  (CPT=71010): CPT

## 2017-01-01 PROCEDURE — 92611 MOTION FLUOROSCOPY/SWALLOW: CPT

## 2017-01-01 PROCEDURE — 88108 CYTOPATH CONCENTRATE TECH: CPT | Performed by: INTERNAL MEDICINE

## 2017-01-01 PROCEDURE — 85730 THROMBOPLASTIN TIME PARTIAL: CPT | Performed by: EMERGENCY MEDICINE

## 2017-01-01 PROCEDURE — 85730 THROMBOPLASTIN TIME PARTIAL: CPT

## 2017-01-01 PROCEDURE — 71010 XR CHEST AP PORTABLE  (CPT=71010): CPT | Performed by: INTERNAL MEDICINE

## 2017-01-01 PROCEDURE — 87581 M.PNEUMON DNA AMP PROBE: CPT | Performed by: EMERGENCY MEDICINE

## 2017-01-01 PROCEDURE — 93010 ELECTROCARDIOGRAM REPORT: CPT | Performed by: INTERNAL MEDICINE

## 2017-01-01 PROCEDURE — 36415 COLL VENOUS BLD VENIPUNCTURE: CPT

## 2017-01-01 PROCEDURE — 96361 HYDRATE IV INFUSION ADD-ON: CPT

## 2017-01-01 PROCEDURE — 97167 OT EVAL HIGH COMPLEX 60 MIN: CPT

## 2017-01-01 PROCEDURE — 83880 ASSAY OF NATRIURETIC PEPTIDE: CPT

## 2017-01-01 PROCEDURE — 76937 US GUIDE VASCULAR ACCESS: CPT

## 2017-01-01 PROCEDURE — 85025 COMPLETE CBC W/AUTO DIFF WBC: CPT | Performed by: EMERGENCY MEDICINE

## 2017-01-01 PROCEDURE — 84132 ASSAY OF SERUM POTASSIUM: CPT

## 2017-01-01 PROCEDURE — 97530 THERAPEUTIC ACTIVITIES: CPT

## 2017-01-01 PROCEDURE — 87999 UNLISTED MICROBIOLOGY PX: CPT

## 2017-01-01 PROCEDURE — 96366 THER/PROPH/DIAG IV INF ADDON: CPT

## 2017-01-01 PROCEDURE — 36592 COLLECT BLOOD FROM PICC: CPT

## 2017-01-01 PROCEDURE — 87040 BLOOD CULTURE FOR BACTERIA: CPT | Performed by: EMERGENCY MEDICINE

## 2017-01-01 PROCEDURE — 87086 URINE CULTURE/COLONY COUNT: CPT | Performed by: INTERNAL MEDICINE

## 2017-01-01 PROCEDURE — 76770 US EXAM ABDO BACK WALL COMP: CPT | Performed by: PHYSICIAN ASSISTANT

## 2017-01-01 PROCEDURE — 71010 XR CHEST AP PORTABLE  (CPT=71010): CPT | Performed by: RADIOLOGY

## 2017-01-01 PROCEDURE — 87077 CULTURE AEROBIC IDENTIFY: CPT | Performed by: EMERGENCY MEDICINE

## 2017-01-01 PROCEDURE — 4350F CNSLNG PROVIDED SYMP MNGMNT: CPT | Performed by: INTERNAL MEDICINE

## 2017-01-01 PROCEDURE — 82375 ASSAY CARBOXYHB QUANT: CPT | Performed by: HOSPITALIST

## 2017-01-01 PROCEDURE — 92610 EVALUATE SWALLOWING FUNCTION: CPT

## 2017-01-01 PROCEDURE — 0W993ZZ DRAINAGE OF RIGHT PLEURAL CAVITY, PERCUTANEOUS APPROACH: ICD-10-PCS | Performed by: RADIOLOGY

## 2017-01-01 PROCEDURE — 81001 URINALYSIS AUTO W/SCOPE: CPT | Performed by: EMERGENCY MEDICINE

## 2017-01-01 PROCEDURE — 74177 CT ABD & PELVIS W/CONTRAST: CPT | Performed by: EMERGENCY MEDICINE

## 2017-01-01 PROCEDURE — 74176 CT ABD & PELVIS W/O CONTRAST: CPT

## 2017-01-01 PROCEDURE — 85027 COMPLETE CBC AUTOMATED: CPT | Performed by: NURSE PRACTITIONER

## 2017-01-01 PROCEDURE — 80053 COMPREHEN METABOLIC PANEL: CPT | Performed by: INTERNAL MEDICINE

## 2017-01-01 PROCEDURE — 87081 CULTURE SCREEN ONLY: CPT | Performed by: HOSPITALIST

## 2017-01-01 PROCEDURE — 97161 PT EVAL LOW COMPLEX 20 MIN: CPT

## 2017-01-01 PROCEDURE — 83735 ASSAY OF MAGNESIUM: CPT | Performed by: NURSE PRACTITIONER

## 2017-01-01 PROCEDURE — 84132 ASSAY OF SERUM POTASSIUM: CPT | Performed by: HOSPITALIST

## 2017-01-01 PROCEDURE — 93970 EXTREMITY STUDY: CPT

## 2017-01-01 PROCEDURE — 97166 OT EVAL MOD COMPLEX 45 MIN: CPT

## 2017-01-01 PROCEDURE — 5A09357 ASSISTANCE WITH RESPIRATORY VENTILATION, LESS THAN 24 CONSECUTIVE HOURS, CONTINUOUS POSITIVE AIRWAY PRESSURE: ICD-10-PCS | Performed by: INTERNAL MEDICINE

## 2017-01-01 PROCEDURE — 82330 ASSAY OF CALCIUM: CPT

## 2017-01-01 PROCEDURE — 70450 CT HEAD/BRAIN W/O DYE: CPT

## 2017-01-01 PROCEDURE — 99309 SBSQ NF CARE MODERATE MDM 30: CPT | Performed by: NURSE PRACTITIONER

## 2017-01-01 PROCEDURE — 97116 GAIT TRAINING THERAPY: CPT

## 2017-01-01 PROCEDURE — 87205 SMEAR GRAM STAIN: CPT | Performed by: INTERNAL MEDICINE

## 2017-01-01 PROCEDURE — 93306 TTE W/DOPPLER COMPLETE: CPT | Performed by: INTERNAL MEDICINE

## 2017-01-01 PROCEDURE — 82800 BLOOD PH: CPT | Performed by: INTERNAL MEDICINE

## 2017-01-01 PROCEDURE — 87486 CHLMYD PNEUM DNA AMP PROBE: CPT | Performed by: EMERGENCY MEDICINE

## 2017-01-01 PROCEDURE — 87040 BLOOD CULTURE FOR BACTERIA: CPT

## 2017-01-01 PROCEDURE — 87798 DETECT AGENT NOS DNA AMP: CPT | Performed by: EMERGENCY MEDICINE

## 2017-01-01 PROCEDURE — B548ZZA ULTRASONOGRAPHY OF SUPERIOR VENA CAVA, GUIDANCE: ICD-10-PCS | Performed by: INTERNAL MEDICINE

## 2017-01-01 PROCEDURE — 89051 BODY FLUID CELL COUNT: CPT | Performed by: INTERNAL MEDICINE

## 2017-01-01 PROCEDURE — 88305 TISSUE EXAM BY PATHOLOGIST: CPT | Performed by: INTERNAL MEDICINE

## 2017-01-01 PROCEDURE — 97535 SELF CARE MNGMENT TRAINING: CPT

## 2017-01-01 PROCEDURE — 84295 ASSAY OF SERUM SODIUM: CPT

## 2017-01-01 PROCEDURE — 76604 US EXAM CHEST: CPT | Performed by: INTERNAL MEDICINE

## 2017-01-01 PROCEDURE — 85018 HEMOGLOBIN: CPT | Performed by: HOSPITALIST

## 2017-01-01 PROCEDURE — 85027 COMPLETE CBC AUTOMATED: CPT | Performed by: INTERNAL MEDICINE

## 2017-01-01 PROCEDURE — 51702 INSERT TEMP BLADDER CATH: CPT

## 2017-01-01 PROCEDURE — 82803 BLOOD GASES ANY COMBINATION: CPT | Performed by: HOSPITALIST

## 2017-01-01 PROCEDURE — 93971 EXTREMITY STUDY: CPT | Performed by: INTERNAL MEDICINE

## 2017-01-01 PROCEDURE — 85610 PROTHROMBIN TIME: CPT | Performed by: EMERGENCY MEDICINE

## 2017-01-01 PROCEDURE — 83735 ASSAY OF MAGNESIUM: CPT | Performed by: EMERGENCY MEDICINE

## 2017-01-01 PROCEDURE — 89050 BODY FLUID CELL COUNT: CPT | Performed by: INTERNAL MEDICINE

## 2017-01-01 PROCEDURE — 93306 TTE W/DOPPLER COMPLETE: CPT

## 2017-01-01 PROCEDURE — 87633 RESP VIRUS 12-25 TARGETS: CPT | Performed by: EMERGENCY MEDICINE

## 2017-01-01 PROCEDURE — 99308 SBSQ NF CARE LOW MDM 20: CPT | Performed by: NURSE PRACTITIONER

## 2017-01-01 PROCEDURE — 87070 CULTURE OTHR SPECIMN AEROBIC: CPT | Performed by: INTERNAL MEDICINE

## 2017-01-01 PROCEDURE — 83735 ASSAY OF MAGNESIUM: CPT | Performed by: HOSPITALIST

## 2017-01-01 PROCEDURE — 0W9B3ZZ DRAINAGE OF LEFT PLEURAL CAVITY, PERCUTANEOUS APPROACH: ICD-10-PCS | Performed by: INTERNAL MEDICINE

## 2017-01-01 PROCEDURE — 87081 CULTURE SCREEN ONLY: CPT | Performed by: INTERNAL MEDICINE

## 2017-01-01 PROCEDURE — 96367 TX/PROPH/DG ADDL SEQ IV INF: CPT

## 2017-01-01 PROCEDURE — 83050 HGB METHEMOGLOBIN QUAN: CPT

## 2017-01-01 PROCEDURE — 82803 BLOOD GASES ANY COMBINATION: CPT

## 2017-01-01 PROCEDURE — 80170 ASSAY OF GENTAMICIN: CPT | Performed by: INTERNAL MEDICINE

## 2017-01-01 PROCEDURE — 80048 BASIC METABOLIC PNL TOTAL CA: CPT | Performed by: NURSE PRACTITIONER

## 2017-01-01 PROCEDURE — 84484 ASSAY OF TROPONIN QUANT: CPT | Performed by: EMERGENCY MEDICINE

## 2017-01-01 PROCEDURE — 82800 BLOOD PH: CPT

## 2017-01-01 PROCEDURE — P9047 ALBUMIN (HUMAN), 25%, 50ML: HCPCS | Performed by: INTERNAL MEDICINE

## 2017-01-01 PROCEDURE — 32554 ASPIRATE PLEURA W/O IMAGING: CPT

## 2017-01-01 PROCEDURE — 71010 XR CHEST AP PORTABLE  (CPT=71010): CPT | Performed by: EMERGENCY MEDICINE

## 2017-01-01 PROCEDURE — 85018 HEMOGLOBIN: CPT

## 2017-01-01 PROCEDURE — 99310 SBSQ NF CARE HIGH MDM 45: CPT | Performed by: NURSE PRACTITIONER

## 2017-01-01 PROCEDURE — 85610 PROTHROMBIN TIME: CPT | Performed by: INTERNAL MEDICINE

## 2017-01-01 PROCEDURE — 94644 CONT INHLJ TX 1ST HOUR: CPT

## 2017-01-01 PROCEDURE — 36600 WITHDRAWAL OF ARTERIAL BLOOD: CPT | Performed by: HOSPITALIST

## 2017-01-01 RX ORDER — ENALAPRIL MALEATE 10 MG/1
20 TABLET ORAL 2 TIMES DAILY
Status: DISCONTINUED | OUTPATIENT
Start: 2017-01-01 | End: 2017-01-01

## 2017-01-01 RX ORDER — POTASSIUM CHLORIDE 20 MEQ/1
40 TABLET, EXTENDED RELEASE ORAL EVERY 4 HOURS
Status: DISCONTINUED | OUTPATIENT
Start: 2017-01-01 | End: 2017-01-01

## 2017-01-01 RX ORDER — POTASSIUM CHLORIDE 20 MEQ/1
40 TABLET, EXTENDED RELEASE ORAL ONCE
Status: COMPLETED | OUTPATIENT
Start: 2017-01-01 | End: 2017-01-01

## 2017-01-01 RX ORDER — GENTAMICIN SULFATE 80 MG/100ML
80 INJECTION, SOLUTION INTRAVENOUS EVERY 24 HOURS
Status: DISCONTINUED | OUTPATIENT
Start: 2017-01-01 | End: 2017-01-01

## 2017-01-01 RX ORDER — DOCUSATE SODIUM 100 MG/1
100 CAPSULE, LIQUID FILLED ORAL DAILY
Status: DISCONTINUED | OUTPATIENT
Start: 2017-01-01 | End: 2017-01-01

## 2017-01-01 RX ORDER — FUROSEMIDE 10 MG/ML
40 INJECTION INTRAMUSCULAR; INTRAVENOUS ONCE
Status: DISCONTINUED | OUTPATIENT
Start: 2017-01-01 | End: 2017-01-01

## 2017-01-01 RX ORDER — CARVEDILOL 3.12 MG/1
3.12 TABLET ORAL 2 TIMES DAILY WITH MEALS
Status: DISCONTINUED | OUTPATIENT
Start: 2017-01-01 | End: 2017-01-01

## 2017-01-01 RX ORDER — ALBUMIN (HUMAN) 12.5 G/50ML
25 SOLUTION INTRAVENOUS ONCE
Status: COMPLETED | OUTPATIENT
Start: 2017-01-01 | End: 2017-01-01

## 2017-01-01 RX ORDER — FUROSEMIDE 10 MG/ML
20 INJECTION INTRAMUSCULAR; INTRAVENOUS ONCE
Status: COMPLETED | OUTPATIENT
Start: 2017-01-01 | End: 2017-01-01

## 2017-01-01 RX ORDER — LISINOPRIL 5 MG/1
5 TABLET ORAL DAILY
Qty: 30 TABLET | Refills: 3 | Status: SHIPPED | OUTPATIENT
Start: 2017-01-01

## 2017-01-01 RX ORDER — POTASSIUM CHLORIDE 20 MEQ/1
40 TABLET, EXTENDED RELEASE ORAL EVERY 4 HOURS
Status: COMPLETED | OUTPATIENT
Start: 2017-01-01 | End: 2017-01-01

## 2017-01-01 RX ORDER — ENOXAPARIN SODIUM 100 MG/ML
40 INJECTION SUBCUTANEOUS DAILY
Status: DISCONTINUED | OUTPATIENT
Start: 2017-01-01 | End: 2017-01-01

## 2017-01-01 RX ORDER — IPRATROPIUM BROMIDE AND ALBUTEROL SULFATE 2.5; .5 MG/3ML; MG/3ML
3 SOLUTION RESPIRATORY (INHALATION) EVERY 4 HOURS PRN
Status: DISCONTINUED | OUTPATIENT
Start: 2017-01-01 | End: 2017-01-01

## 2017-01-01 RX ORDER — SODIUM CHLORIDE 0.9 % (FLUSH) 0.9 %
10 SYRINGE (ML) INJECTION AS NEEDED
Status: DISCONTINUED | OUTPATIENT
Start: 2017-01-01 | End: 2017-01-01

## 2017-01-01 RX ORDER — LOPERAMIDE HYDROCHLORIDE 2 MG/1
2 TABLET ORAL EVERY 4 HOURS PRN
COMMUNITY

## 2017-01-01 RX ORDER — ONDANSETRON 2 MG/ML
4 INJECTION INTRAMUSCULAR; INTRAVENOUS EVERY 4 HOURS PRN
Status: DISCONTINUED | OUTPATIENT
Start: 2017-01-01 | End: 2017-01-01

## 2017-01-01 RX ORDER — LABETALOL HYDROCHLORIDE 5 MG/ML
10 INJECTION, SOLUTION INTRAVENOUS EVERY 6 HOURS PRN
Status: DISCONTINUED | OUTPATIENT
Start: 2017-01-01 | End: 2017-01-01

## 2017-01-01 RX ORDER — NITROGLYCERIN 20 MG/100ML
10 INJECTION INTRAVENOUS CONTINUOUS
Status: DISCONTINUED | OUTPATIENT
Start: 2017-01-01 | End: 2017-01-01

## 2017-01-01 RX ORDER — FUROSEMIDE 10 MG/ML
INJECTION INTRAMUSCULAR; INTRAVENOUS
Status: DISPENSED
Start: 2017-01-01 | End: 2017-01-01

## 2017-01-01 RX ORDER — ONDANSETRON 2 MG/ML
4 INJECTION INTRAMUSCULAR; INTRAVENOUS EVERY 6 HOURS PRN
Status: DISCONTINUED | OUTPATIENT
Start: 2017-01-01 | End: 2017-01-01

## 2017-01-01 RX ORDER — LORAZEPAM 0.5 MG/1
0.5 TABLET ORAL 3 TIMES DAILY PRN
COMMUNITY

## 2017-01-01 RX ORDER — PRAVASTATIN SODIUM 20 MG
20 TABLET ORAL NIGHTLY
Status: DISCONTINUED | OUTPATIENT
Start: 2017-01-01 | End: 2017-01-01

## 2017-01-01 RX ORDER — ENALAPRIL MALEATE 10 MG/1
10 TABLET ORAL DAILY
Status: DISCONTINUED | OUTPATIENT
Start: 2017-01-01 | End: 2017-01-01

## 2017-01-01 RX ORDER — SODIUM CHLORIDE 9 MG/ML
INJECTION, SOLUTION INTRAVENOUS ONCE
Status: COMPLETED | OUTPATIENT
Start: 2017-01-01 | End: 2017-01-01

## 2017-01-01 RX ORDER — SODIUM CHLORIDE 9 MG/ML
INJECTION, SOLUTION INTRAVENOUS CONTINUOUS
Status: ACTIVE | OUTPATIENT
Start: 2017-01-01 | End: 2017-01-01

## 2017-01-01 RX ORDER — FUROSEMIDE 10 MG/ML
40 INJECTION INTRAMUSCULAR; INTRAVENOUS DAILY
Status: DISCONTINUED | OUTPATIENT
Start: 2017-01-01 | End: 2017-01-01

## 2017-01-01 RX ORDER — CARVEDILOL 3.12 MG/1
3.12 TABLET ORAL 2 TIMES DAILY WITH MEALS
Qty: 60 TABLET | Refills: 3 | Status: SHIPPED | OUTPATIENT
Start: 2017-01-01

## 2017-01-01 RX ORDER — ACETAMINOPHEN 500 MG
500 TABLET ORAL EVERY 4 HOURS PRN
Status: DISCONTINUED | OUTPATIENT
Start: 2017-01-01 | End: 2017-01-01

## 2017-01-01 RX ORDER — POTASSIUM CHLORIDE 29.8 MG/ML
40 INJECTION INTRAVENOUS ONCE
Status: COMPLETED | OUTPATIENT
Start: 2017-01-01 | End: 2017-01-01

## 2017-01-01 RX ORDER — NITROGLYCERIN 0.4 MG/1
0.4 TABLET SUBLINGUAL EVERY 5 MIN PRN
Status: DISCONTINUED | OUTPATIENT
Start: 2017-01-01 | End: 2017-01-01

## 2017-01-01 RX ORDER — ACETAMINOPHEN 160 MG
2000 TABLET,DISINTEGRATING ORAL DAILY
Status: DISCONTINUED | OUTPATIENT
Start: 2017-01-01 | End: 2017-01-01

## 2017-01-01 RX ORDER — NITROGLYCERIN 20 MG/100ML
INJECTION INTRAVENOUS
Status: COMPLETED
Start: 2017-01-01 | End: 2017-01-01

## 2017-01-01 RX ORDER — ASPIRIN 325 MG
325 TABLET ORAL DAILY
Status: DISCONTINUED | OUTPATIENT
Start: 2017-01-01 | End: 2017-01-01

## 2017-01-01 RX ORDER — CALCIUM CARBONATE 200(500)MG
500 TABLET,CHEWABLE ORAL 2 TIMES DAILY PRN
Status: DISCONTINUED | OUTPATIENT
Start: 2017-01-01 | End: 2017-01-01

## 2017-01-01 RX ORDER — SODIUM CHLORIDE 9 MG/ML
INJECTION, SOLUTION INTRAVENOUS CONTINUOUS
Status: DISCONTINUED | OUTPATIENT
Start: 2017-01-01 | End: 2017-01-01

## 2017-01-01 RX ORDER — POTASSIUM CHLORIDE 14.9 MG/ML
20 INJECTION INTRAVENOUS ONCE
Status: COMPLETED | OUTPATIENT
Start: 2017-01-01 | End: 2017-01-01

## 2017-01-01 RX ORDER — LABETALOL HYDROCHLORIDE 5 MG/ML
10 INJECTION, SOLUTION INTRAVENOUS EVERY 4 HOURS PRN
Status: DISCONTINUED | OUTPATIENT
Start: 2017-01-01 | End: 2017-01-01

## 2017-01-01 RX ORDER — ACETAMINOPHEN 650 MG/1
975 SUPPOSITORY RECTAL ONCE
Status: COMPLETED | OUTPATIENT
Start: 2017-01-01 | End: 2017-01-01

## 2017-01-01 RX ORDER — LOSARTAN POTASSIUM 25 MG/1
12.5 TABLET ORAL DAILY
Status: DISCONTINUED | OUTPATIENT
Start: 2017-01-01 | End: 2017-01-01

## 2017-01-01 RX ORDER — DOCUSATE SODIUM 100 MG/1
100 CAPSULE, LIQUID FILLED ORAL DAILY PRN
Status: DISCONTINUED | OUTPATIENT
Start: 2017-01-01 | End: 2017-01-01

## 2017-01-01 RX ORDER — ENALAPRIL MALEATE 10 MG/1
10 TABLET ORAL ONCE
Status: COMPLETED | OUTPATIENT
Start: 2017-01-01 | End: 2017-01-01

## 2017-01-01 RX ORDER — FUROSEMIDE 10 MG/ML
INJECTION INTRAMUSCULAR; INTRAVENOUS
Status: COMPLETED
Start: 2017-01-01 | End: 2017-01-01

## 2017-01-01 RX ORDER — HYDROCODONE BITARTRATE AND ACETAMINOPHEN 5; 325 MG/1; MG/1
1 TABLET ORAL EVERY 4 HOURS PRN
Status: ON HOLD | COMMUNITY
End: 2017-01-01

## 2017-01-01 RX ORDER — FUROSEMIDE 10 MG/ML
20 INJECTION INTRAMUSCULAR; INTRAVENOUS
Status: DISCONTINUED | OUTPATIENT
Start: 2017-01-01 | End: 2017-01-01

## 2017-01-01 RX ORDER — AMLODIPINE BESYLATE 5 MG/1
5 TABLET ORAL DAILY
Qty: 30 TABLET | Refills: 1 | Status: ON HOLD | OUTPATIENT
Start: 2017-01-01 | End: 2017-01-01

## 2017-01-01 RX ORDER — ALPRAZOLAM 0.25 MG/1
0.25 TABLET ORAL 3 TIMES DAILY PRN
Status: DISCONTINUED | OUTPATIENT
Start: 2017-01-01 | End: 2017-01-01

## 2017-01-01 RX ORDER — FUROSEMIDE 10 MG/ML
40 INJECTION INTRAMUSCULAR; INTRAVENOUS ONCE
Status: COMPLETED | OUTPATIENT
Start: 2017-01-01 | End: 2017-01-01

## 2017-01-01 RX ORDER — AMLODIPINE BESYLATE 5 MG/1
5 TABLET ORAL DAILY
Qty: 30 TABLET | Refills: 1 | Status: SHIPPED | OUTPATIENT
Start: 2017-01-01 | End: 2017-01-01

## 2017-01-01 RX ORDER — LORAZEPAM 0.5 MG/1
0.5 TABLET ORAL ONCE AS NEEDED
Status: COMPLETED | OUTPATIENT
Start: 2017-01-01 | End: 2017-01-01

## 2017-01-01 RX ORDER — LORAZEPAM 0.5 MG/1
0.5 TABLET ORAL EVERY 4 HOURS PRN
Status: DISCONTINUED | OUTPATIENT
Start: 2017-01-01 | End: 2017-01-01

## 2017-01-01 RX ORDER — FUROSEMIDE 10 MG/ML
40 INJECTION INTRAMUSCULAR; INTRAVENOUS
Status: DISCONTINUED | OUTPATIENT
Start: 2017-01-01 | End: 2017-01-01

## 2017-01-01 RX ORDER — ASPIRIN 325 MG
325 TABLET ORAL DAILY
Qty: 30 TABLET | Refills: 0 | Status: SHIPPED | OUTPATIENT
Start: 2017-01-01

## 2017-01-01 RX ORDER — FUROSEMIDE 20 MG/1
20 TABLET ORAL DAILY
COMMUNITY

## 2017-01-01 RX ORDER — FUROSEMIDE 40 MG/1
40 TABLET ORAL DAILY
Status: DISCONTINUED | OUTPATIENT
Start: 2017-01-01 | End: 2017-01-01

## 2017-01-01 RX ORDER — LISINOPRIL 5 MG/1
5 TABLET ORAL DAILY
Status: DISCONTINUED | OUTPATIENT
Start: 2017-01-01 | End: 2017-01-01

## 2017-01-01 RX ORDER — HALOPERIDOL 5 MG/ML
1 INJECTION INTRAMUSCULAR EVERY 4 HOURS PRN
Status: DISCONTINUED | OUTPATIENT
Start: 2017-01-01 | End: 2017-01-01

## 2017-01-01 RX ORDER — AMLODIPINE BESYLATE 5 MG/1
5 TABLET ORAL DAILY
Status: DISCONTINUED | OUTPATIENT
Start: 2017-01-01 | End: 2017-01-01

## 2017-01-01 RX ORDER — ACETAMINOPHEN 325 MG/1
650 TABLET ORAL EVERY 6 HOURS PRN
Status: DISCONTINUED | OUTPATIENT
Start: 2017-01-01 | End: 2017-01-01

## 2017-01-01 RX ORDER — IPRATROPIUM BROMIDE AND ALBUTEROL SULFATE 2.5; .5 MG/3ML; MG/3ML
SOLUTION RESPIRATORY (INHALATION)
Status: COMPLETED
Start: 2017-01-01 | End: 2017-01-01

## 2017-01-01 RX ORDER — LOPERAMIDE HYDROCHLORIDE 2 MG/1
2 CAPSULE ORAL 4 TIMES DAILY PRN
Status: DISCONTINUED | OUTPATIENT
Start: 2017-01-01 | End: 2017-01-01

## 2017-01-01 RX ORDER — ENOXAPARIN SODIUM 100 MG/ML
40 INJECTION SUBCUTANEOUS EVERY 24 HOURS
Status: DISCONTINUED | OUTPATIENT
Start: 2017-01-01 | End: 2017-01-01

## 2017-01-01 RX ORDER — METOPROLOL TARTRATE 50 MG/1
50 TABLET, FILM COATED ORAL
Status: DISCONTINUED | OUTPATIENT
Start: 2017-01-01 | End: 2017-01-01

## 2017-01-01 RX ORDER — FUROSEMIDE 20 MG/1
20 TABLET ORAL DAILY
Status: DISCONTINUED | OUTPATIENT
Start: 2017-01-01 | End: 2017-01-01

## 2017-01-21 PROBLEM — R50.9 FEVER, UNSPECIFIED FEVER CAUSE: Status: ACTIVE | Noted: 2017-01-01

## 2017-01-21 PROBLEM — R40.0 SOMNOLENCE: Status: ACTIVE | Noted: 2017-01-01

## 2017-01-21 PROBLEM — R45.1 AGITATION: Status: ACTIVE | Noted: 2017-01-01

## 2017-01-21 PROBLEM — E86.0 DEHYDRATION: Status: ACTIVE | Noted: 2017-01-01

## 2017-01-21 PROBLEM — F03.91 DEMENTIA WITH BEHAVIORAL DISTURBANCE, UNSPECIFIED DEMENTIA TYPE (HCC): Status: ACTIVE | Noted: 2017-01-01

## 2017-01-21 NOTE — ED INITIAL ASSESSMENT (HPI)
Pt presents to ER with AMS, combative with staff, and refusing medications/food. Skin warm and dry. Respirations equal and nonlabored. Pt resting. Appears startled when aroused at this time.

## 2017-01-22 NOTE — H&P
General Medicine H&P     Patient presents with:  Altered Mental Status (neurologic)       PCP: Lord Donna MD    History of Present Illness: Patient is a 80year old female with PMH sig for dementia, HTN, CAD, PAD, who p/t [facility] ED from [facility] for incr unable to do full neuro exam      LABS:     Lab Results  Component Value Date   WBC 17.1 01/21/2017   HGB 12.5 01/21/2017   HCT 36.9 01/21/2017   .0 01/21/2017   CREATSERUM 0.86 01/21/2017   BUN 13 01/21/2017    01/21/2017   K 4.1 01/21/2017 used. Dose information is transmitted to the ACR ([address] [place] [address] of Radiology) [address]) which includes the Dose Index Registry.   PATIENT STATED HISTORY:     FINDINGS:  LIVER:  Normal.  No enlargement, atrophy, abnormal dens 9/17/2016, 7:20. INDICATIONS:  AMS, combative  PATIENT STATED HISTORY:  Patient with AMS.     FINDINGS:  Dual lead pacemaker device identified, the atrial component is projecting in a different direction when compared to the prior study may have been repos hospital records reviewed. [facility] hospitalist to continue to follow patient while in house.  D/w RN Selene Gerard MD  [facility]  Pager: [phone]  1/22/2017  2:86 PM      **Certification        PHYSICIAN Certification of Need for I

## 2017-01-22 NOTE — PROGRESS NOTES
01/22/17 1339   Clinical Encounter Type   Visited With Patient   Routine Visit Introduction   Continue Visiting No   Patient's Supportive Strategies/Resources Patient expressed feelings   Patient Spiritual Encounters   Spiritual Needs Kelsi expressed

## 2017-01-22 NOTE — ED PROVIDER NOTES
Patient Seen in: [facility] Emergency Department    History   Patient presents with:  Altered Mental Status (neurologic)    Stated Complaint: AMS, combative     HPI    79 YO FEMALE WITH DEMENTIA, HYPERTENSION, SYNCOPE, CAD, peripheral arterial disea mouth daily. Calcium Carbonate Antacid (MIGDALIA-GEST ANTACID) 500 MG Oral Chew Tab,  Chew 1 tablet by mouth 2 (two) times daily.          Family History   Problem Relation Age of Onset   • Family history unknown: Yes         Smoking Status: Never Smoker There is no focal motor deficit noted. Skin exam: Warm to touch. Good skin turgor. No lacerations, abrasions, lesions noted.     ED Course     Labs Reviewed   COMP METABOLIC PANEL (14) - Abnormal; Notable for the following:     Glucose 124 (*)     Albumi COMPARISON:  None. INDICATIONS:  AMS, combative  TECHNIQUE:  Noncontrast CT scanning is performed through the brain. Dose reduction techniques were used. PATIENT STATED HISTORY:  History of dementia. AMS. Patient extremely combative and uncooperative.  Mov Normal.  No mass or enlargement. AORTA/VASCULAR:  Normal.  No aneurysm. RETROPERITONEUM:  Normal.  No mass or adenopathy. BOWEL/MESENTERY:  Normal.  No visible mass, obstruction, or bowel wall thickening. ABDOMINAL WALL:  Normal.  No mass or hernia.   U Tom Ugalde MD on 1/21/2017 at 19:36     Approved by: Leighann Beaver MD              WBC is 17,000. Urinalysis is negative for nitrite or leukocyte esterase. Lactic acid is 1.5. A respiratory virus panel is been ordered.   Patient be admitted for continu

## 2017-01-22 NOTE — SLP NOTE
Order received for swallow eval.  Per RN, Kari, pt has been combative with all care and is currently resting comfortably. Hold swallow eval this date. ST will f/u 1/23/17 and complete swallow eval as appropriate. Seun Whyte M.S.,CCC-SLP

## 2017-01-22 NOTE — CONSULTS
[facility]  Report of Consultation    Andreina Weiss Patient Status:  Inpatient    10/17/1927 MRN [id number]   [facility] 5NW-A Attending Arpita Bonilla, [address] [facility] [address] Day # 1 PCP Sanford Chamberlain MD     Reason for Consultation:  H/o PE with swallowing    • Dementia          Past Surgical History    CARDIAC PACEMAKER PLACEMENT      Comment [employer]      Family History   Problem Relation Age of Onset   • Family history unknown: Yes      reports that she has never smoked.  She has never use Peripheral pulses are 2+. Neurologic: Alert and agitated, moves all 4 ext, normal affect. Skin: Warm and dry.      Laboratories and Data:    Diagnostics:  Telemetry: Vpaced    CT abd: mild/mod right hydronephrosis  CXR: no infiltrate    Labs:     Recent L

## 2017-01-22 NOTE — PROGRESS NOTES
[facility] Dosing Service  Antibiotic Dosing    Lit Wise is a 80year old female for whom pharmacy is dosing Zosyn for treatment of  Fever. Allergies: has No Known Allergies.     Vitals: /53 mmHg  Pulse 77  Temp(Src) 98.4 °F (36.9 °C) (A

## 2017-01-22 NOTE — PROGRESS NOTES
Patient with no urine output since she was straight cathed in the ER, approximately 9 hrs. Bladder scan showing 369-467ml. Attempted straight cath with RN and PCT assist. Patient very uncooperative and upset with procedure, unable to straight cath.  [place]

## 2017-01-22 NOTE — PLAN OF CARE
Impaired Swallowing    • Minimize aspiration risk Not Progressing        NEUROLOGICAL - ADULT    • Achieves stable or improved neurological status Not Progressing          Patient/Family Goals    • Patient/Family Long Term Goal Progressing    • Patient/Fam

## 2017-01-22 NOTE — PROGRESS NOTES
NURSING ADMISSION NOTE      Patient admitted via Cart from ER. Oriented to room 524. Safety precautions initiated. Bed in low position. Call light in reach. Patient admitted from ER, unable to assess orientation.  Patient screams and yells out when

## 2017-01-23 NOTE — PROGRESS NOTES
[facility] Cardiology Progress Note        Katy Araya Patient Status:  Inpatient    10/17/1927 MRN [id number]   [facility] 5NW-A Attending Bernadette Tadeo MD   [facility] Day # 2 PCP Ary Mccray MD     Sub CREATSERUM  0.86  0.72  0.60   CA  8.5  8.3  8.4   MG   --    --   2.0   GLU  124*  95  98       Recent Labs   Lab  01/21/17   1804   ALT  15   AST  20   ALB  2.8*       No results for input(s): TROP, CK in the last 72 hours.     No results for input(s):

## 2017-01-23 NOTE — PHYSICAL THERAPY NOTE
PHYSICAL THERAPY EVALUATION - INPATIENT     Room Number: 524/524-A  Evaluation Date: 1/23/2017  Type of Evaluation: Initial  Physician Order: PT Eval and Treat    Presenting Problem: somnolence  Reason for Therapy: Mobility Dysfunction and Discharge Pl ASSESSMENT  Rating: Unable to rate  Location: R arm  Management Techniques: Activity promotion; Body mechanics;Breathing techniques;Relaxation;Repositioning    COGNITION  · Overall Cognitive Status:  Impaired  · Orientation Level:  oriented to person  · CBS Minimum assistance (CGA with verbal/manual cues for managing walker)  Distance (ft): 300  Assistive Device: Rolling walker  Pattern: Shuffle  Stoop/Curb Assistance: Not tested       Skilled Therapy Provided: Patient was met lying in bed, agreeable to PT.  P able to demonstrate transfers Sit to/from Stand at assistance level: supervision     Goal #3 Patient is able to ambulate 300 feet with assist device: walker - rolling at assistance level: supervision     Goal #4 Patient to be appropriate for standardized a

## 2017-01-23 NOTE — PROGRESS NOTES
[facility] Hospitalist Progress Note                                                                   Marissa Salas.  10/17/1927    SUBJECTIVE: alert and oriented. Passed swallow. tmax 100. 3. Dehydration    80year old female with PMH sig for dementia, HTN, CAD, PAD, who p/t [facility] ED from [facility] for increased agitation/AMS, noted to have temp 100.4 and WBC 17.1 in ED    Encephalopathy secondary to strep bacteremia sepsis w underlying dementia; r/

## 2017-01-23 NOTE — PLAN OF CARE
Impaired Swallowing    • Minimize aspiration risk Progressing        NEUROLOGICAL - ADULT    • Achieves stable or improved neurological status Progressing        Patient/Family Goals    • Patient/Family Long Term Goal Progressing    • Patient/Family Short

## 2017-01-23 NOTE — OCCUPATIONAL THERAPY NOTE
OCCUPATIONAL THERAPY EVALUATION - INPATIENT     Room Number: 524/524-A  Evaluation Date: 1/23/2017  Type of Evaluation: Initial  Presenting Problem: Somnolence, agitation, dehydration and fever     Physician Order: IP Consult to Occupational Therapy  Rebecca is to rest     OBJECTIVE  Precautions: Bed/chair alarm  Fall Risk: High fall risk    WEIGHT BEARING RESTRICTION  Weight Bearing Restriction: None                PAIN ASSESSMENT  Ratin  Location: N/A       COGNITION  Orientation Level:  oriented to pers required total A for donning both socks. Pt performed supine>sit EOB with min assistance. Pt performed sit>stand with RW and mod assistance with min verbal/visual/tactile cues for safety with RW use and hand placement.  Pt performed functional mobility with stand:  with min assist  Patient will transfer to toilet:  with min assist    Additional Goals:  Pt will tolerate sitting in her chair for 3 meals per day.

## 2017-01-24 NOTE — PLAN OF CARE
Problem: Patient/Family Goals  Goal: Patient/Family Short Term Goal  Patient’s Short Term Goal: 1/21 noc: to remain comfortable  1/22 MENTAL STATUS TO IMPROVE  -1/22 pms: \"to sleep\"  1/23 PM: return to baseline mental status  1/24-Return to baseline ment

## 2017-01-24 NOTE — CM/SW NOTE
01/24/17 1600   CM/SW Referral Data   Referral Source Physician;Social Work (self-referral)   Reason for Referral Discharge planning   Informant Children;Patient   Pertinent Medical Hx   Primary Care Physician Name [facility]   Patient Info   Patient's Me

## 2017-01-24 NOTE — SLP NOTE
SPEECH DAILY NOTE - INPATIENT    Evaluation Date: 01/24/2017    ASSESSMENT & PLAN   ASSESSMENT  Pt seen for dysphagia tx to assess tolerance with recommended diet, ensure proper utilization of aspiration precautions and provide pt/family education.   Pt fou

## 2017-01-24 NOTE — PROGRESS NOTES
[facility]                INFECTIOUS DISEASE PROGRESS NOTE    Bogdan Torrez Patient Status:  Inpatient    10/17/1927 MRN [id number]   [facility] 5NW-A Attending Penny Coyne MD   [facility] Day # 3 PCP Katya Crystal MD   Order Status: Completed Lab Status: Final result Updated: 01/24/17 2828     Specimen Information: Blood from Blood,peripheral       Blood Culture Result Streptococcus viridans (A)       Blood Culture Smear Gram positive cocci in chains      Blood Culture Hyperlipidemia, mixed     Bilateral carotid artery disease (HCC)     Pedal edema     Syncope     Hyponatremia     Hypokalemia     Hyperglycemia     Metabolic alkalosis     Hip fracture, right (HCC)     Hip fracture, right, closed, initial encounter

## 2017-01-24 NOTE — PROGRESS NOTES
01/24/17 0654   Provider Notification   Reason for Communication Evaluate;Critical value   Test/Procedure Name positive blood cultures   Provider Name Dr. Andra Payne of Communication Page   Response Phone call; No new orders   Notification Time 6140

## 2017-01-24 NOTE — PROGRESS NOTES
Pt is a 81 y/o female admitted with AMS and agitation. pt is alert but still agitated and confused. Strep bacteremia,she is on iv PCN and gentamycin. denies any pain or discomfort.her k-level 3.6. replaced. no fever,N/V or SOB noted. pts BP is elevated. getting

## 2017-01-24 NOTE — OCCUPATIONAL THERAPY NOTE
Attempted to see pt for skilled OT services this date. Pt is currently not appropriate for skilled therapy services as she is agitated. Will re-attempt as schedule allows.  Erendira Holguin, 01/24/2017, 9:54 AM

## 2017-01-24 NOTE — PROGRESS NOTES
[facility] [address] [place] [address] Cardiology Progress Note        Chris Salcedo Patient Status:  Inpatient    10/17/1927 MRN [id number]   [facility] Attending Danuta Nixon MD   [facility] Day # 3 PCP Elijah Ferguson MD     Sub 10. 1   HGB  12.5  10.5*  10.6*   PLT  201.0  188.0  178.0       Chem:  [employer]   Lab  01/21/17   1804  01/22/17   1455  01/23/17   0648  01/24/17   0641   NA  135*  139  137   --    K  4.1  3.3*  3.3*  3.3*  3.6   CL  102  106  104   --    CO2  24.0

## 2017-01-25 NOTE — PROGRESS NOTES
[facility] Cardiology Progress Note        Nikolas Roque Patient Status:  Inpatient    10/17/1927 MRN [id number]   [facility]A Attending Sundeep Garduno MD   [facility] Day # 4 PCP Beth Redmond MD     Sub 10. 1   HGB  10.5*  10.6*   PLT  188.0  178.0       Chem:  [employer]   Lab  01/22/17   1455  01/23/17   0648  01/24/17   0641  01/25/17   0546   NA  139  137   --   141   K  3.3*  3.3*  3.3*  3.6  3.4*  3.4*   CL  106  104   --   107   CO2  24.0  21.0*

## 2017-01-25 NOTE — PROGRESS NOTES
[facility] Hospitalist Progress Note                                                                   Marissa HARTMAN 38.  10/17/1927    SUBJECTIVE: sitting in chair, lunch is in front of her.  William Toledo Somnolence  Active Problems:    Agitation    Dementia with behavioral disturbance, unspecified dementia type    Fever, unspecified fever cause    Dehydration    80year old female with PMH sig for dementia, HTN, CAD, PAD, who p/t [facility] ED from [facility]

## 2017-01-25 NOTE — PROGRESS NOTES
[facility]                INFECTIOUS DISEASE PROGRESS NOTE    Nicanor Primrose Patient Status:  Inpatient    10/17/1927 MRN [id number]   [facility] 5NW-A Attending Mendoza Davis MD   [address] Day # 4 PCP Eduard Singer MD   Order Status: Completed Lab Status: Final result Updated: 01/24/17 1412     Specimen Information: Blood from Blood,peripheral       Blood Culture Result Streptococcus viridans (A)       Blood Culture Smear Gram positive cocci in chains        Strep speci Fever, unspecified fever cause     Dehydration      ASSESSMENT/PLAN:  1.  High grade bacteremia - Strep viridans c/w endovascular infection  Suspected infected pacemaker leads, or endocarditis  - isolate I to PCN ADAM 1  Continue ceftriaxone  Cleotha Numbers for [place]

## 2017-01-25 NOTE — PROGRESS NOTES
Pt is a 81 y/o female admitted with AMS and agitation. pt is alert but still agitated and confused. Strep bacteremia,she is on iv Rocephin and gentamycin. denies any pain or discomfort.her k-level 3.4 replaced. no fever,N/V or SOB noted. pts BP is stable. olive

## 2017-01-26 NOTE — PROGRESS NOTES
[facility] [address] [place] [address] Cardiology Progress Note        Bharat Ojeda Patient Status:  Inpatient    10/17/1927 MRN [id number]   [facility] 5NW-A Attending Laverne Hoffman MD   [facility] Day # 5 PCP Sadie Deal MD     Sub EOSPCT    Chem:  Recent Labs   Lab  01/24/17   0641  01/25/17   0546  01/25/17   1856  01/26/17   0639   NA   --   141   --   140   K  3.6  3.4*  3.4*  4.1  3.4*   CL   --   107   --   107   CO2   --   23.0   --   24.0   BUN   --   13   --   13   LUCINA

## 2017-01-26 NOTE — CM/SW NOTE
MSW spoke to Dtr about about hhc for home infusion vs. [facility]. Dtr states that patient's spouse is in hospice and she does not want them  any longer then needed.  MSW explained that Kevyn Ramirez is teach and train only for [facility], dtr states there is no one to

## 2017-01-26 NOTE — PLAN OF CARE
Patient/Family Goals    • Patient/Family Long Term Goal Progressing    • Patient/Family Short Term Goal Progressing        SAFETY ADULT - FALL    • Free from fall injury Progressing        Received patient A/O x2, VSS with no complaints of pain.  Maintainin

## 2017-01-26 NOTE — PROGRESS NOTES
[facility]                INFECTIOUS DISEASE PROGRESS NOTE    Rajesh Scott Patient Status:  Inpatient    10/17/1927 MRN [id number]   [facility] 5NW-A Attending Vishnu Pearson MD   [facility] Day # 5 PCP Judah Tripp MD   Order Status: Completed Lab Status: Final result Updated: 01/24/17 1412     Specimen Information: Blood from Blood,peripheral       Blood Culture Result Streptococcus viridans (A)       Blood Culture Smear Gram positive cocci in chains        Strep speci Fever, unspecified fever cause     Dehydration      ASSESSMENT/PLAN:  1.  High grade bacteremia - Strep viridans c/w endovascular infection  Suspected infected pacemaker leads, or endocarditis  - isolate I to PCN ADAM 1  Continue ceftriaxone  Tori Whitfield

## 2017-01-26 NOTE — PLAN OF CARE
Impaired Activities of Daily Living    • Achieve highest/safest level of independence in self care Progressing        Impaired Functional Mobility    • Achieve highest/safest level of mobility/gait Progressing        Impaired Swallowing    • Minimize aspir Agitation     Dementia with behavioral disturbance, unspecified dementia type     Fever, unspecified fever cause     Dehydration       Active issue(s) requiring resolution prior to discharge: infection    Anticipated discharge date: pending    Current disc

## 2017-01-26 NOTE — PROGRESS NOTES
[facility] Hospitalist Progress Note                                                                   Marissa HARTMAN 38.  10/17/1927    SUBJECTIVE: sitting in chair, denies pain.  Says ppl are \"pic Polyvalent    Assessment/Plan:  Principal Problem:    Somnolence  Active Problems:    Agitation    Dementia with behavioral disturbance, unspecified dementia type    Fever, unspecified fever cause    Dehydration    80year old female with PMH sig for demen

## 2017-01-26 NOTE — PHYSICAL THERAPY NOTE
PHYSICAL THERAPY TREATMENT NOTE - INPATIENT    Room Number: 524/524-A     Session: 1   Number of Visits to Meet Established Goals: 5    Presenting Problem: somnolence    Problem List  Principal Problem:    Somnolence  Active Problems:    Agitation    Shola another person does the patient currently need. ..   -   Moving to and from a bed to a chair (including a wheelchair)?: A Little   -   Need to walk in hospital room?: A Little   -   Climbing 3-5 steps with a railing?: A Lot    AM-PAC Score:  Raw Score: 17 achieve highest functional independence/return to baseline. DISCHARGE RECOMMENDATIONS  PT Discharge Recommendations: Assisted living with PT     PLAN  PT Treatment Plan: Bed mobility; Endurance; Patient education;Gait training;Family education;Jose E Lopez

## 2017-01-26 NOTE — OCCUPATIONAL THERAPY NOTE
OCCUPATIONAL THERAPY TREATMENT NOTE - INPATIENT     Room Number: 524/524-A  Session: 1   Number of Visits to Meet Established Goals: 5    Presenting Problem: Somnolence, agitation, dehydration and fever     History related to current admission: Pt was admi Putting on and taking off regular upper body clothing?: A Little  -   Taking care of personal grooming such as brushing teeth?: A Little  -   Eating meals?: A Little    AM-PAC Score:  Score: 15  Approx Degree of Impairment: 56.46%  Standardized Score (AM-P strength and functional mobility. These deficits manifest functionally while performing dressing, bathing and toileting.   The patient is below baseline and would benefit from skilled inpatient OT to address the above deficits, maximizing patient's ability

## 2017-01-27 NOTE — OCCUPATIONAL THERAPY NOTE
OCCUPATIONAL THERAPY TREATMENT NOTE - INPATIENT     Room Number: 524/524-A  Session: 2   Number of Visits to Meet Established Goals: 5    Presenting Problem: Somnolence, agitation, dehydration and fever     History related to current admission: Pt was admi urinal? : A Lot  -   Putting on and taking off regular upper body clothing?: A Little  -   Taking care of personal grooming such as brushing teeth?: None  -   Eating meals?: None    AM-PAC Score:  Score: 17  Approx Degree of Impairment: 50.11%  Standardize function. OT Discharge Recommendations: Assisted living with PT/OT  OT Device Recommendations: TBD    PLAN  OT Treatment Plan: Balance activities; Energy conservation/work simplification techniques;ADL training;Functional transfer training; Endurance tr

## 2017-01-27 NOTE — CM/SW NOTE
8:30am 1/27/17  MSW spoke to [facility] from [facility]- she will look into if there are any providers who are private pay RN's who can come daily to provide the infusion services to patient.     8:47am  MSW spoke to [facility] who will look into co

## 2017-01-27 NOTE — PROGRESS NOTES
[facility]                INFECTIOUS DISEASE PROGRESS NOTE    Pranav Soto Patient Status:  Inpatient    10/17/1927 MRN [id number]   [facility] 5NW-A Attending Vipul Hays MD   [address] Day # 6 PCP Matheus Childs MD   Blood Culture FREQ X 2 [[id number]] (Abnormal)  Collected: 01/21/17 8632     Order Status: Completed Lab Status: Final result Updated: 01/24/17 1412     Specimen Information: Blood from Blood,peripheral       Blood Culture Result Streptococcus viridans (A Hip fracture, right, closed, initial encounter ([facility] [address]     Bradycardia     At risk for falling     Closed intertrochanteric fracture of right femur ([facility] [address]             Global exp 12-16-16 / RIGHT FEMUR / RFM      Plantar fasciitis, right     Screening for c

## 2017-01-27 NOTE — PLAN OF CARE
Impaired Swallowing    • Minimize aspiration risk Progressing        NEUROLOGICAL - ADULT    • Achieves stable or improved neurological status Progressing        Assumed care this morning  Patient alert and oriented x 3 although with on and off confusion a

## 2017-01-27 NOTE — PROGRESS NOTES
[facility] Cardiology Progress Note        Bharat Ojeda Patient Status:  Inpatient    10/17/1927 MRN [id number]   [facility] 5NW-A Attending Laverne Hoffman MD   [address] Day # 6 PCP Sadie Deal MD     Sub NEUTOPHILPCT, EOSPCT    Chem:  Recent REVENTIVE   Lab  01/25/17   0546  01/25/17   1856  01/26/17   0639  01/27/17   0551   NA  141   --   140   --    K  3.4*  3.4*  4.1  3.4*  3.7   CL  107   --   107   --    CO2  23.0   --   24.0   --    BUN  13   --   13   -

## 2017-01-27 NOTE — CM/SW NOTE
MSW spoke to Lilia from the [facility], she thinks they may have a bed for pt on Saturday; due to documentation of agitation liaison to come see patient today 1/27/17.

## 2017-01-27 NOTE — CM/SW NOTE
MSW spoke to Dtr @ 4:30pm, she knows that [facility] can not take her mother for a few days. MSW emailed SNF list for patient's dtr to review. She was asked to call Armando Hogan on Saturday with choice.

## 2017-01-27 NOTE — PROGRESS NOTES
[facility] Hospitalist Progress Note                                                                   Marissa Salas.  10/17/1927    SUBJECTIVE: no issues.   No fevers    OBJECTIVE:  Temp:  [97.5 Agitation    Dementia with behavioral disturbance, unspecified dementia type    Fever, unspecified fever cause    Dehydration    80year old female with PMH sig for dementia, HTN, CAD, PAD, who p/t [facility] ED from [facility] for increased agitation/AMS, noted to h

## 2017-01-27 NOTE — PLAN OF CARE
Patient/Family Goals    • Patient/Family Long Term Goal Progressing    • Patient/Family Short Term Goal Progressing        SAFETY ADULT - FALL    • Free from fall injury Progressing        Received patient A/O x1, VSS with no complaints of pain.  Maintainin

## 2017-01-27 NOTE — CM/SW NOTE
MSW spoke to Dtr about D/C options. Patient is ready for d/c and can not go to [place] [address] with PICC Line.     Patient's dtr would like her mother to go to [place],  MSW called liaison and explained that family would want patient to transfer to [place], and

## 2017-01-27 NOTE — SLP NOTE
SPEECH DAILY NOTE - INPATIENT    Evaluation Date: 01/27/2017    ASSESSMENT & PLAN   ASSESSMENT  Pt seen for dysphagia tx to assess tolerance with recommended diet, ensure proper utilization of aspiration precautions and provide pt/family education.   Patien demonstrate understanding and implementation of aspiration precautions and swallow strategies independently over 2-3 session(s).        Goal met     FOLLOW UP  Follow Up Needed: No  SLP Follow-up Date: 01/25/17  Number of Visits to Meet Established Goals: 3

## 2017-01-28 NOTE — CM/SW NOTE
01/28/17 1500   Discharge disposition   Discharged to: Skilled Nurs   Name of [facility] [address], [address]   Discharge transportation [employer]  [phone])     [address]  Q:[phone]  M:[phone]

## 2017-01-28 NOTE — PLAN OF CARE
Received patient lethargic, wakes up to verbal stimuli, oriented x 2-3. Cooperative with care. No new complaints. Currently sleeping. Vital signs stable. Will continue to monitor.     Impaired Activities of Daily Living    • Achieve highest/safest level of

## 2017-01-28 NOTE — CM/SW NOTE
[facility] able to accept pt today. [facility] and [facility] still reviewing. sw spoke to dtr and she is agreeable to discharge to \[facility]\"". sw arranged [facility] for 6pm. PCS form completed. RN to call report to [phone].      Plan: pt to disch

## 2017-01-28 NOTE — PROGRESS NOTES
[place] [facility] Cardiology Progress Note        Ramind Dominictonio Patient Status:  Inpatient    10/17/1927 MRN [id number]   [facility] 5NW-A Attending Sal Ruff MD   [facility] Day # 7 PCP Bruce Menon MD     Sub Left ventricle: The cavity size was normal. There was mild concentric     hypertrophy. Systolic function was normal. The estimated ejection     fraction was 55-60%. There was no diagnostic evidence for regional wall     motion abnormalities.   2. Aortic libby metoprolol add norvasc for BP   Increased enalapril to 20 mg BID. Abx per ID. Hopefully cultures clear  I DISCUSSED WITH DR. BEAR. F/U BLOOD CX'S ON IV ABX.   IF WE HAVE FURTHER POSITIVE CX, THEN WILL NEED TO REMOVE PPM.  IF NO FURTHER POSITIVE CX'S, W

## 2017-01-28 NOTE — CM/SW NOTE
[facility] still reviewing referral. [facility] unable to accept.  sw to follow

## 2017-01-28 NOTE — PROGRESS NOTES
Assumed patient care at . Patient alert and oriented x2-3. O2 sats wnl on room air. VSS. Safety precautions in place. Will continue to monitor.

## 2017-01-28 NOTE — PROGRESS NOTES
[facility] Hospitalist Progress Note                                                                   Marissa Salas.  10/17/1927    SUBJECTIVE: no issues. No fevers  grandaughters at bedside. Saline Flush, Labetalol HCl, haloperidol lactate, ondansetron HCl, influenza virus vaccine PF, Pneumococcal Vac Polyvalent    Assessment/Plan:  Principal Problem:    Somnolence  Active Problems:    Agitation    Dementia with behavioral disturbance, unspeci

## 2017-01-28 NOTE — PLAN OF CARE
Problem: Patient/Family Goals  Goal: Patient/Family Long Term Goal  Patient’s Long Term Goal: discharge back to [facility]    Interventions:  - improved mentition  - See additional Care Plan goals for specific interventions   Outcome: Adequate for Discharge noted   Outcome: Adequate for Discharge    Problem: SAFETY ADULT - FALL  Goal: Free from fall injury  INTERVENTIONS:  - Assess pt frequently for physical needs  - Identify cognitive and physical deficits and behaviors that affect risk of falls.   - Institut

## 2017-01-28 NOTE — CM/SW NOTE
sw spoke to dtr regarding additional referrals to be made. sw discussed alternate facility options and referrals made to the following facilities: [facility], [facility], [facility]. Referrals pending.  timoteo to follow    [address] [facility]

## 2017-01-29 NOTE — PROGRESS NOTES
NURSING DISCHARGE NOTE    Discharged Rehab facility via Ambulance. Accompanied by Family member and Support staff  Belongings Taken by patient/family. Picc line left in place per MD order. Went over discharge instructions with Felicity Krause at [address]. Khalif Wilkes

## 2017-05-04 NOTE — DISCHARGE SUMMARY
General Medicine Discharge Summary     Patient ID:  French Walker  80year old  10/17/1927    Admit date: 1/21/2017    Discharge date and time: 1/28/2017  6:40 PM     Attending Physician: Sharon att.  pro severe AS  - resume eliquis, stop lovenox  - cardiology consulted  - BP meds resumed, enalapril increased to 20mg BID    Bilateral PE's on CTA 9/18/16  - cont eliquis    PAD  -has had peripheral interventions    Dementia  -w current agitation    Hyponatrem Historical    Calcium Carbonate Antacid (MIGDALIA-GEST ANTACID) 500 MG Oral Chew Tab  Chew 1 tablet by mouth 2 (two) times daily. , Historical    Enalapril Maleate 10 MG Oral Tab  Take 10 mg by mouth daily. , Historical    Pravastatin Sodium 20 MG Oral Tab  [facility]

## 2017-06-14 PROBLEM — D64.9 ANEMIA: Status: ACTIVE | Noted: 2017-01-01

## 2017-06-14 PROBLEM — R77.8 ELEVATED TROPONIN: Status: ACTIVE | Noted: 2017-01-01

## 2017-06-14 PROBLEM — D72.829 LEUKOCYTOSIS: Status: ACTIVE | Noted: 2017-01-01

## 2017-06-14 PROBLEM — D69.6 THROMBOCYTOPENIA (HCC): Status: ACTIVE | Noted: 2017-01-01

## 2017-06-14 PROBLEM — I95.9 HYPOTENSION, UNSPECIFIED HYPOTENSION TYPE: Status: ACTIVE | Noted: 2017-01-01

## 2017-06-14 PROBLEM — E83.42 HYPOMAGNESEMIA: Status: ACTIVE | Noted: 2017-01-01

## 2017-06-14 PROBLEM — I95.9 HYPOTENSION: Status: ACTIVE | Noted: 2017-01-01

## 2017-06-14 NOTE — ED NOTES
Dysphagia screening complete prior to administration of PO K-dur. Pt passed and gave K-dur in applesauce. Tolerated well.

## 2017-06-14 NOTE — ED INITIAL ASSESSMENT (HPI)
Pt presents via EMS from assisted living with reports from staff of fatigue, generalized weakness, fever, hypotension and abd distention/pain.

## 2017-06-14 NOTE — ED NOTES
Pt cleaned from stool in brief, clean brief applied. Skin integrity intact, but some redness noted to sacral area. Pt repositioned off backside with pillow and given blanket.

## 2017-06-14 NOTE — ED NOTES
Pt's daughter, Luke Alfred, called for an update on patient - verified demographics with her for PHI release via phone.   She states mother has Alzheimer's that may worsen now that she is in the hospital due to the last time she was in the hospital her spouse

## 2017-06-14 NOTE — ED PROVIDER NOTES
Patient Seen in: [facility] Emergency Department    History   Patient presents with:  Abdomen/Flank Pain (GI/)  Fever (infectious)    Stated Complaint: hypotension, fever, abd distension/pain    HPI    Patient is an 71-year-old female nursing home Oral Tab,  Take 1 tablet (50 mg total) by mouth 2x Daily(Beta Blocker). acetaminophen 325 MG Oral Tab,  Take 650 mg by mouth every 4 (four) hours as needed for Pain. Cholecalciferol (VITAMIN D) 2000 UNITS Oral Cap,  Take 1 capsule by mouth daily. nondistended, nontender. Bowel sounds present. SKIN: Skin is pink warm and dry. There are no rashes. EXTREMITIES: The patient moves all 4 extremities freely. No cyanosis, clubbing, or edema.    NEUROLOGIC: The patient is awake, alert, and oriented to se LACTIC ACID, PLASMA - Normal   LACTIC ACID, PLASMA - Normal   CBC WITH DIFFERENTIAL WITH PLATELET    Narrative: The following orders were created for panel order CBC WITH DIFFERENTIAL WITH PLATELET.   Procedure                               Abnormalit material. Post contrast coronal MPR imaging was performed. Dose reduction techniques were used. Dose information is transmitted to the [facility] [employer] of Radiology) NRDR ([address]) which includes the Dose Index Registry.   PAT patient was attached to a cardiac monitor. An EKG was obtained and reviewed as noted above. Patient was observed while the laboratory and radiology studies were obtained.     Results of the patient's laboratory and radiology studies were reviewed and discus

## 2017-06-14 NOTE — ED NOTES
Report on patient given to charge RN on CTU. Receiving RN states she is in change of shift report. Transport contacted for transfer to 2600.   Pt

## 2017-06-14 NOTE — ED NOTES
Pt sleeping, O2 sat dropping while resting. Lowest was 89%, up to 91% when aroused verbally. Placed on O2 NC 2L.

## 2017-06-14 NOTE — PROGRESS NOTES
06/14/17 1747   Vital Signs   Temp (!) 97.3 °F (36.3 °C)   Temp src Oral   Pulse 101   Heart Rate Source Monitor   Resp 20   Respiratory Quality Normal   /53 mmHg   BP Location Right arm   BP Method Automatic   Patient Position Lying

## 2017-06-14 NOTE — CONSULTS
[facility] Cardiology Consultation Rafa Menon MD    The patient was interviewed, examined, the chart was reviewed and the consult was dictated. This is a 80year old female with a chief complaint of weakness and altered mental status. Impression:  1.   We

## 2017-06-14 NOTE — H&P
[facility] Hospitalist History and Physical      Patient presents with:  Abdomen/Flank Pain (GI/)  Fever (infectious)       PCP: Sanford Chamberlain MD      History of Present Illness: Patient is a 80year old female with PMH sig for HTN, dementia, syncope, CAD, daily.     Pravastatin Sodium 20 MG Oral Tab Take 20 mg by mouth nightly.           Smoking status: Never Smoker     Smokeless tobacco: Never Used    Alcohol Use: No        Fam Hx  Family History   Problem Relation Age of Onset   • Family history unknown: Y (cpt=71010)    6/14/2017  PROCEDURE:  XR CHEST AP PORTABLE (CPT=71010)  TECHNIQUE:  AP chest radiograph was obtained. COMPARISON:  [facility] , XR CHEST AP PORTABLE  (CPT=71010), 1/21/2017, 19:01.   INDICATIONS:  hypotension, fever, abd distension/pain  PATIEN Normal. AORTA/VASCULAR:  Calcified tortuous abdominal aorta RETROPERITONEUM:  No enlarged adenopathy. BOWEL/MESENTERY:  Appendix not visualized. Colonic diverticulosis. Fluid filled normal caliber bowel loops. Evaluation of bowel is limited by motion.  [facility] Hospitalist  [phone]    Outpatient records or previous hospital records reviewed. [facility] hospitalist to continue to follow patient while in house  A total of 70 minutes taken with patient and coordinating care. Greater than 50% face to face encounter.

## 2017-06-15 NOTE — CONSULTS
INFECTIOUS DISEASE CONSULT NOTE    Kole Smoke Patient Status:  Inpatient    10/17/1927 MRN [id number]   [facility] 2NE-A Attending Ezio Pena MD   [facility] Day # 1 PCP Jayla Briseno smokeless tobacco. She reports that she does not drink alcohol or use illicit drugs.     Allergies:  No Known Allergies    Medications:    Current facility-administered medications:   •  Potassium Chloride ER (K-DUR M20) CR tab 40 mEq, 40 mEq, Oral, Q4H  • rash    Laboratory Data:    Recent Labs   Lab  06/13/17   1030  06/14/17   0928  06/15/17   0425   RBC  4.59  3.57*  3.51*   HGB  12.7  10.0*  9.7*   HCT  37.4  29.1*  28.9*   MCV  81.5  81.5  82.3   MCH  27.7  28.0  27.6   MCHC  34.0  34.4  33.6   RDW  14 detected by PCR      Blood Culture FREQ X 2 [[id number]] (Abnormal) Collected: 06/14/17 0919     Order Status: Completed Lab Status: Preliminary result Updated: 06/15/17 0832     Specimen Information: Blood from Blood,peripheral       Blood Culture Result S thickening in the lung bases with bibasilar infiltrates/atelectasis. These have progressed. LIVER:  Mild periportal edema. Homogeneous enhancement. BILIARY:  Gallbladder is contracted No biliary ductal dilatation. PANCREAS:  Homogeneous enhancement.  SPLEEN consult   -send Ucx   -recheck BCx to doc clearance   -await echo   -if no obvious source determined after above w/u and proven to have recurrent strep viridans BSI, may perform a tagged WBC scan  Case and plan d/w Dr Erlin Lacy and with pt.   Thank you for al

## 2017-06-15 NOTE — CONSULTS
[facility]    PATIENT'S NAME: Evelyne Salvador   ATTENDING PHYSICIAN: Ashish Higginbotham DO   CONSULTING PHYSICIAN: Peter Farris M.D.    PATIENT ACCOUNT#:   [de-identified]    LOCATION:  E-A 2600 A ED  MEDICAL RECORD #:   [id number]       DATE OF BI was found again to have leukocytosis and hypotension that resolved with IV fluids. The patient received IV antibiotics after several blood cultures were drawn.     PAST MEDICAL HISTORY:  Significant for above-stated infection, fall, hip fracture, hip repla antibiotics after culture.     Dictated By Noris Morton M.D.  d: 06/14/2017 16:52:37  t: 06/14/2017 18:54:07  Carmelina Kapoor [id number]  [facility]/

## 2017-06-15 NOTE — PHYSICAL THERAPY NOTE
PHYSICAL THERAPY QUICK EVALUATION - INPATIENT    Room Number: 5629/5837-G  Evaluation Date: 6/15/2017  Presenting Problem: AMS  Physician Order: PT Eval and Treat    Problem List  Principal Problem:    Hypotension, unspecified hypotension type  Active Prob strength is within functional limits     NEUROLOGICAL FINDINGS  Neurological Findings: None                   AM-PAC '6-Clicks' INPATIENT SHORT FORM - BASIC MOBILITY  How much difficulty does the patient currently have. ..  -   Turning over in bed (includin Inpatient Mobility Team and will continue with  amb with rw and le therex to maintain current level of mobility.    The rehab aide will perform treatment activities prescribed by this physical therapist. The rehab aide will communicate with overseeing PT re

## 2017-06-15 NOTE — CM/SW NOTE
Case discussed in rounds. Per RN, patient is pleasantly confused. SW spoke with patient's daughter, Elaine Smith (P: [phone]) to obtain information necessary to complete this assessment.   Per Elaine Smith, patient lives at [facility] in [facility]

## 2017-06-15 NOTE — PROGRESS NOTES
[facility] Cardiology Progress Note - Néstor Dockery Patient Status:  Inpatient    10/17/1927 MRN [id number]   [facility] 2NE-A Attending Lus Bloch, [address] [facility] Day # 1 PCP Marcela Bhardwaj MD     Subjective: cardiovascular condition     Somnolence     Agitation     Dementia with behavioral disturbance, unspecified dementia type     Fever, unspecified fever cause     Dehydration     Thrombocytopenia (HCC)     Anemia     Leukocytosis     Hypotension     Hypotens 14.1*  19.4*  13.9*   HGB  12.7  10.0*  9.7*   MCV  81.5  81.5  82.3   PLT  186.0  140.0*  137.0*   INR   --   2.17*   --        Recent Labs   Lab  06/13/17   1030  06/14/17   0928  06/15/17   0425   NA  131*  130*  136   K  3.5*  3.3*  3.5*   CL  97*  96*

## 2017-06-15 NOTE — PROGRESS NOTES
[facility] Hospitalist Progress Note                                                                   Marissa Salas.  10/17/1927    CC: F/U AMS    SUBJECTIVE:    Pt seen around 2 pm. Pt sleepin Thrombocytopenia (HCC)    Anemia    Leukocytosis    Hypotension    Elevated troponin    Hypomagnesemia      Patient is a 80year old female with PMH sig for HTN, dementia, syncope, CAD, PAD, HL, and dysphagia who presents to ER from nursing home for AMS an

## 2017-06-15 NOTE — PLAN OF CARE
CARDIOVASCULAR - ADULT    • Maintains optimal cardiac output and hemodynamic stability Progressing    • Absence of cardiac arrhythmias or at baseline Progressing        Impaired Swallowing    • Minimize aspiration risk Progressing        METABOLIC/FLUID AN

## 2017-06-15 NOTE — CONSULTS
[facility] Urology   Consultation Note    Elroyanor Primrose Patient Status:  Inpatient    10/17/1927 MRN [id number]   [facility] 2NE-A Attending Angela Painting MD   [facility] Day # 1 PCP Eduard Singer MD     Reason for [facility])    • Peripheral vascular disease ([facility] [address]    • COPD (chronic obstructive pulmonary disease) ([facility] [address]    • Depression    • Anxiety state          Past Surgical History    CARDIAC PACEMAKER PLACEMENT      Comment [employer]      Family History   Problem Relat prolapse noted. SKIN: Without stigmata. EXTREMITIES: Without edema.       Laboratory Data:    Lab Results  Component Value Date   WBC 13.9 06/15/2017   HGB 9.7 06/15/2017   HCT 28.9 06/15/2017   .0 06/15/2017   CREATSERUM 0.55 06/15/2017   BUN 1 Thank you for allowing me to participate in the care of your patient.     Kera Francis P.A.-C  [facility] Urology  6/15/2017  11:25 AM

## 2017-06-15 NOTE — PLAN OF CARE
Impaired Swallowing    • Minimize aspiration risk Completed          CARDIOVASCULAR - ADULT    • Maintains optimal cardiac output and hemodynamic stability Progressing    • Absence of cardiac arrhythmias or at baseline Progressing        METABOLIC/FLUID AN

## 2017-06-15 NOTE — OCCUPATIONAL THERAPY NOTE
OCCUPATIONAL THERAPY QUICK EVALUATION - INPATIENT    Room Number: 2076/0762-R  Evaluation Date: 6/15/2017     Type of Evaluation: Quick Eval  Presenting Problem: Hypotension, AMS    Physician Order: IP Consult to Occupational Therapy  Reason for Therapy: pedal    • CAD (coronary artery disease)    • PAD (peripheral artery disease) (HCC)    • High blood pressure    • High cholesterol    • Carotid artery disease ([address]    • Falls    • Pacemaker    • Dysphagia    • Problems with swallowing    • Dementia    • P Little  -   Bathing (including washing, rinsing, drying)?: A Little  -   Toileting, which includes using toilet, bedpan or urinal? : A Little  -   Putting on and taking off regular upper body clothing?: A Little  -   Taking care of personal grooming such a admission.     Patient was able to achieve the following goals:  Patient able to toilet transfer: at previous functional level  Patient able to dress lower extremities: at previous functional level  Patient/Caregiver able to demonstrate safety with ADLS: at

## 2017-06-16 NOTE — PLAN OF CARE
Impaired Functional Mobility      •  Achieve highest/safest level of mobility/gait  Progressing            RESPIRATORY - ADULT      •  Achieves optimal ventilation and oxygenation  Progressing            SKIN/TISSUE INTEGRITY - ADULT      •  Skin integrity

## 2017-06-16 NOTE — PROGRESS NOTES
[facility]  Urology Progress Note    Pretty Jara Patient Status:  Inpatient    10/17/1927 MRN [id number]   [facility] 2NE-A Attending Ismael Hutton MD   [facility] Day # 2 PCP Isamar Villarreal MD     Subjective:  Pretty Jara i [place]  [facility] Urology  6/16/2017  7:55 AM

## 2017-06-16 NOTE — PROGRESS NOTES
[facility] Cardiology Progress Note - Reese Camera Patient Status:  Inpatient    10/17/1927 MRN [id number]   [facility] 2NE-A Attending Ismael Hutton MD   [facility] Day # 2 PCP Isamar Villarreal MD     Subjective:   Samm Conner Intake/Output Summary (Last 24 hours) at 06/16/17 1319  Last data filed at 06/16/17 1240   Gross per 24 hour   Intake   2050 ml   Output   1180 ml   Net    870 ml       Last 3 Weights  06/14/17 2126 : 120 lb 13 oz (54.8 kg)  06/14/17 0911 : 125 lb (56. Lab  06/14/17   0928   ALT  14   AST  19   ALB  2.0*       Recent Labs   Lab  06/14/17 0928   TROP  0.274*       Allergies:   No Known Allergies    Medications:    Current Facility-Administered Medications:  CefTRIAXone Sodium (ROCEPHIN) 2 g in sodium

## 2017-06-16 NOTE — PROGRESS NOTES
[facility] Hospitalist Progress Note                                                                   Marissa HARTMAN 38.  10/17/1927    CC: F/U AMS    SUBJECTIVE:    Pt sleeping, awakens easily.  Al Hyponatremia    Hypokalemia    Dehydration    Thrombocytopenia (HCC)    Anemia    Leukocytosis    Hypotension    Elevated troponin    Hypomagnesemia      Patient is a 80year old female with PMH sig for HTN, dementia, syncope, CAD, PAD, HL, and dysphagia w

## 2017-06-16 NOTE — PROGRESS NOTES
[address] [place] [address]  TEL: [phone]  FAX: [phone]    Porter Willis Patient Status:  Inpatient    10/17/1927 MRN [id number]   [facility] 2NE-A Attending Mesfin Cadena, [facility] Day # 2 PCP Simi Ledesma ALKPHO  84   --    --    --    AST  19   --    --    --    ALT  14   --    --    --    BILT  0.7   --    --    --    TP  5.3*   --    --    --        Microbiology    Reviewed in EMR,   Bcx 6/14 S mitis/ oralis 2/2  Bcx 6/15 pend  Ucx mixed naomy    Radio history. LFTs have been nl.     2. AV block s/p pacemaker, lead could be infected but she is not a candidate for removal    3. R sided hydronephrosis per imaging due to retention- resolved after castro    4.  Leukocytosis- reactive to above, better    PLAN:

## 2017-06-17 NOTE — PROGRESS NOTES
[facility]  TEL: [phone]  FAX: [phone]    Elier Mccarthy Patient Status:  Inpatient    10/17/1927 MRN [id number]   [facility] 2NE-A Attending Chelsey Marks, [facility] Day # 3 PCP Sweta Lao 19.0*       Microbiology    Reviewed in EMR,   Bcx 6/14 S mitis/ oralis 2/2  Bcx 6/15 pend  Ucx mixed naomy    Radiology: Us Kidney/bladder (cpt=76770)    6/16/2017  PROCEDURE:  US KIDNEY/BLADDER (CPT=76770)  COMPARISON:  [facility] , CT ABDOMEN PELVIS IV CON hydronephrosis per imaging due to retention- resolved after castro    4.  Leukocytosis- reactive to above, better    PLAN:              -continue PCN; ceftriaxone discontinued              -recheck BCx to doc clearance - ngtd              -[place] echo but pt

## 2017-06-17 NOTE — PROGRESS NOTES
[facility] Cardiology Progress Note - Cece Del Real Patient Status:  Inpatient    10/17/1927 MRN [id number]   [facility] 2NE-A Attending Christopher Medina, [facility] Day # 3 PCP Katya Crystal MD     Subjective: Somnolence     Agitation     Dementia with behavioral disturbance, unspecified dementia type     Fever, unspecified fever cause     Dehydration     Thrombocytopenia (HCC)     Anemia     Leukocytosis     Hypotension     Hypotension, unspecified hypotension 82.3  83.2  85.1   PLT  140.0*  137.0*  154.0  178.0   INR  2.17*   --    --    --        Recent Labs   Lab  06/14/17   0928  06/15/17   0425  06/15/17   1547  06/16/17   1014  06/17/17   0521   NA  130*  136   --   137  137   K  3.3*  3.5*  4.4  3.9  3.7

## 2017-06-17 NOTE — PLAN OF CARE
CARDIOVASCULAR - ADULT    • Maintains optimal cardiac output and hemodynamic stability Progressing    • Absence of cardiac arrhythmias or at baseline Progressing          Rcv'd A/ to self  DNR  Denies pain or discomfort  On tele with V-pacing with BBB  Colin Beyer

## 2017-06-17 NOTE — PROGRESS NOTES
[facility] Hospitalist Progress Note                                                                   Marissa Salas.  10/17/1927    CC: F/U AMS    SUBJECTIVE:  Pt up in chair eating breakfast. ipratropium-albuterol, iohexol, Calcium Carbonate Antacid, docusate sodium, Loperamide HCl, acetaminophen, ondansetron HCl    Assessment/Plan:  Principal Problem:    Hypotension, unspecified hypotension type  Active Problems:    Hyponatremia    Hypokalemia

## 2017-06-18 NOTE — PLAN OF CARE
Assumed patient care at 0700. Patient alert/oriented to self. Weaned to room air, saturations maintaining greater than 90%. Vpaced on telemetry, PO Eliquis for VTE prophylaxis. Nitro drip d/c'd.  IV diuretics as ordered. PO medications as ordered.

## 2017-06-18 NOTE — PROGRESS NOTES
[facility] Cardiology Progress Note - Sandy Driscoll Patient Status:  Inpatient    10/17/1927 MRN [id number]   [facility] 2NE-A Attending Sola Alejandra, [address] [facility] Day # 4 PCP Matheus Childs MD     Subjective: Intake/Output Summary (Last 24 hours) at 06/18/17 0736  Last data filed at 06/18/17 0408   Gross per 24 hour   Intake 669.85 ml   Output   1375 ml   Net -705.15 ml       Last 3 Weights  06/18/17 0408 : 122 lb 12.7 oz (55.7 kg)  06/14/17 2126 : 120 lb 1 No results for input(s): ALT, AST, ALB, AMYLASE, LIPASE, LDH in the last 72 hours. Invalid input(s): ALPHOS, TBIL, DBIL, TPROT    No results for input(s): TROP in the last 72 hours.     Allergies:   No Known Allergies    Medications:    Current Fac

## 2017-06-18 NOTE — PLAN OF CARE
Received patient at 1500. Alert and Oriented x1, is confused. Tele Rhythm VPaced. O2 saturation 93% On room air. Breath sounds diminished. Bed is locked and in low position. Call light and personal items within reach. Pt up to BR with SBA and walker.  Up to

## 2017-06-18 NOTE — PROGRESS NOTES
[facility] Hospitalist Progress Note                                                                   Marissa Salas.  10/17/1927    CC: F/U AMS    SUBJECTIVE:  Pt up in chair eating breakfast. ipratropium-albuterol, iohexol, Calcium Carbonate Antacid, docusate sodium, Loperamide HCl, acetaminophen, ondansetron HCl    Assessment/Plan:  Principal Problem:    Hypotension, unspecified hypotension type  Active Problems:    Hyponatremia    Hypokalemia

## 2017-06-19 NOTE — PROGRESS NOTES
Spoke to ROSS Barnes. Ok to hold coreg tonight due to strict NPO for r/o aspiration pna. Swallow study in the AM.     Continue to watch DBP since < 40. Patient has been running low this admission. No orders at this time.  Call if MAP continues to decreas

## 2017-06-19 NOTE — PROGRESS NOTES
[facility] Hospitalist Progress Note                                                                   Marissa HARTMAN 38.  10/17/1927    CC: F/U AMS    SUBJECTIVE:  Pt with some sob this am, she had Carbonate Antacid, docusate sodium, Loperamide HCl, acetaminophen, ondansetron HCl    Assessment/Plan:  Principal Problem:    Hypotension, unspecified hypotension type  Active Problems:    Hyponatremia    Hypokalemia    Dehydration    Thrombocytopenia ([address]

## 2017-06-19 NOTE — PROGRESS NOTES
[facility] Cardiology Progress Note - Brijesh Thomas Patient Status:  Inpatient    10/17/1927 MRN [id number]   [facility] 2NE-A Attending Tato Esquivel, [address] [facility] Day # 5 PCP Kymberly Lopez MD     Subjective: kg)  06/14/17 0911 : 125 lb (56.7 kg)  05/30/17 1203 : 120 lb (54.432 kg)  01/28/17 0454 : 113 lb 12.8 oz (51.619 kg)  01/27/17 0441 : 112 lb 7 oz (51 kg)  01/26/17 0517 : 110 lb 14.3 oz (50.3 kg)  01/25/17 0513 : 107 lb (48.535 kg)  01/24/17 0416 : 112 lb 40 mg 40 mg Intravenous Daily   Losartan Potassium (COZAAR) tab 12.5 mg 12.5 mg Oral Daily   furosemide (LASIX) injection 40 mg 40 mg Intravenous Once   ipratropium-albuterol (DUONEB) nebulizer solution 3 mL 3 mL Nebulization Q4H PRN   penicillin G potassi

## 2017-06-19 NOTE — PROGRESS NOTES
Received a call from patient's daughter Faby Carrasco regarding an update. Informed her of the possible aspiration and speech evaluation for video swallow tomorrow. She stated the at the patient will not allow for a PEG tube.     In addition, the kevin

## 2017-06-19 NOTE — PROGRESS NOTES
[address] [place] [address]  TEL: [phone]  FAX: [phone]    Seven Tinsley Patient Status:  Inpatient    10/17/1927 MRN [id number]   [facility] 2NE-A Attending Arthur Koyanagi, [address] [facility] Day # 5 IZABEL Raya CHEST (CPT=71020)    INDICATIONS: SOB    COMPARISON: [facility] , XR CHEST AP PORTABLE (CPT=71010), 1/21/2017, 19:01. [facility] , XR CHEST AP PORTABLE (CPT=71010), 6/14/2017, 9:49. [facility] , XR CHEST AP PORTABLE (CPT=71010), 6/17/2017, 3:55.     TECHNIQUE: PA and l Strep is katey, would plan on 6 weeks of treatment              -recheck BCx to doc clearance              -not a candidate for lead removal due to advanced age/ comorbidities   -monitor for evidence of metastatic foci of infection   -diuresis per cards

## 2017-06-19 NOTE — CM/SW NOTE
Received call from patient's RN, Rhina, stating she spoke with patient's daughter, Mer Soriano.   Mer Soriano requested 24 hour notice prior to patient being discharged so that family can provide transportation to [facility] [place] as opposed to patient traveling by [employer]

## 2017-06-19 NOTE — SLP NOTE
ADULT SWALLOWING EVALUATION    ASSESSMENT & PLAN   ASSESSMENT  Bedside swallow evaluation completed due to concerns of aspiration per CXR; see below. Per RN, pt had difficulty breathing this morning causing pt to panic, and CXR subsequently done.  RN also r vascular disease ([facility] [address]    • COPD (chronic obstructive pulmonary disease) ([facility])    • Depression    • Anxiety state        Prior Living Situation: Skilled nursing facility  Diet Prior to Admission: Regular; Thin liquids  Precautions: Aspiration    Comments: therapy;Videofluoroscopic swallow study  Number of Visits to Meet Established Goals: 3  Follow Up Needed: Yes  SLP Follow-up Date: 06/20/17    Thank you for your referral.   If you have any questions, please contact Yarely Moreau

## 2017-06-20 NOTE — PROGRESS NOTES
Patient presented sitting upright in bed side chair; VSS and agreeable to participate. Transferred sit>stand w/SBA. Ambulated 200' w/RW and SBA. Upon completion, patient made comfortable in [facility] w/ call light in reach. RONNI Reyes aware of session.

## 2017-06-20 NOTE — PROGRESS NOTES
[address] [place] [address]  TEL: [phone]  FAX: [phone]    Lit Wise Patient Status:  Inpatient    10/17/1927 MRN [id number]   [facility] 2NE-A Attending Kalpana Le, [address] [facility] Day # 6 PCP Fei Chang , XR CHEST AP PORTABLE (CPT=71010), 1/21/2017, 19:01. [facility] , XR CHEST AP PORTABLE (CPT=71010), 6/14/2017, 9:49. [facility] , XR CHEST AP PORTABLE (CPT=71010), 6/17/2017, 3:55. TECHNIQUE: PA and lateral chest radiographs were obtained.     PATIENT STATED HI plan on 6 weeks of treatment, could use CTX instead for easier administration.  Will need PICC if pt and daughter agree- this was d/w them today, they wanted to think about it as pt would need to go to [facility] for IV abx, could not go back to [facility] with picc

## 2017-06-20 NOTE — PROGRESS NOTES
[facility] Hospitalist Progress Note                                                                   Marissa HARTMAN 38.  10/17/1927    CC: F/U AMS    SUBJECTIVE:  Pt  Did video swallow today and s HCl    Assessment/Plan:  Principal Problem:    Hypotension, unspecified hypotension type  Active Problems:    Hyponatremia    Hypokalemia    Dehydration    Thrombocytopenia (HCC)    Anemia    Leukocytosis    Hypotension    Elevated troponin    Hypomagnesem

## 2017-06-20 NOTE — CDS QUERY
Heart Failure  CLINICAL DOCUMENTATION CLARIFICATION FORM  Dear Doctor:  Clinical information (provided below) includes a diagnosis of Heart Failure.  For accurate ICD-10-CM code assignment to reflect severity of illness and risk of mortality,  PLEASE (X) AL respiratory distress, tachycardia, pulmonary infiltrates on chest x-ray. IV fluids were discontinued patient has received IV Lasix and IV nitroglycerin and CPAP with improvement.   Will continue IV Lasix and low-dose IV nitroglycerin    6/17 Lasix injectio

## 2017-06-20 NOTE — SLP NOTE
Preliminary VFSS results relayed to RN via telephone. Pt had one episode of transient laryngeal penetration thin via straw. No aspiration. Safe to initiate regular diet (choose softer foods) with thin liquids. Aspiration precautions. 90 degrees upright.

## 2017-06-20 NOTE — PROGRESS NOTES
[facility] Cardiology Progress Note - Keo Rape Patient Status:  Inpatient    10/17/1927 MRN [id number]   [facility] 2NE-A Attending Davon Romano, 160[address] [facility] Day # 6 PCP Ary Mccray MD     Subjective: : 120 lb 13 oz (54.8 kg)  06/14/17 0911 : 125 lb (56.7 kg)  05/30/17 1203 : 120 lb (54.432 kg)  01/28/17 0454 : 113 lb 12.8 oz (51.619 kg)  01/27/17 0441 : 112 lb 7 oz (51 kg)  01/26/17 0517 : 110 lb 14.3 oz (50.3 kg)  01/25/17 0513 : 107 lb (48.535 kg)  0 tab 3.125 mg 3.125 mg Oral BID with meals   Losartan Potassium (COZAAR) tab 12.5 mg 12.5 mg Oral Daily   furosemide (LASIX) injection 40 mg 40 mg Intravenous Once   ipratropium-albuterol (DUONEB) nebulizer solution 3 mL 3 mL Nebulization Q4H PRN   penicill

## 2017-06-20 NOTE — VIDEO SWALLOW STUDY NOTE
ADULT VIDEOFLUOROSCOPIC SWALLOWING STUDY    Admission Date: 6/14/2017  Evaluation Date: 06/20/2017  Radiologist: Dr. Louie Maldonado    Dear Dr. Taya Juarez,  This letter is to inform you of Sunshine Castillo Videofluoroscopic Swallowing Study results and/or plan the left lung base in the area of the effusion could be due to impressive atelectasis. CARDIAC:  Normal size cardiac silhouette. Stable left-sided cardiac pacemaker.   MEDIASTINUM:  Normal.  PLEURA:  Small bilateral pleural effusions, left greater than rig Location: Mild;Valleculae;Pyriform sinuses  Cleared/Reduced with: Secondary swallow (partial clearance)  Laryngeal Penetration:  (none)  Tracheal Aspiration:  (none)    Penetration Aspiration Scale Score: Score 2: Material enters the airway, remains above liquids/solids, Upright 90 degrees, Eliminate distractions with mild assistance 100 % of the time across 2 sessions. EDUCATION/INSTRUCTION  Reviewed results and recommendations with patient/family/caregiver.   Agreement/Understanding

## 2017-06-20 NOTE — CM/SW NOTE
SALOMON spoke with Alberta Abreu, Director of Nursing at [facility] ([phone]) who informed writer that patient is unable to return to [facility] on [place] [address]. It is unclear whether or not patient will be discharged on IVABX at this point.   Discussed with patient's

## 2017-06-21 NOTE — SIGNIFICANT EVENT
Patient desats to 86% for five minutes at room air. She was put back to 3 liters/min of O2 with oxygen saturation of 94%.

## 2017-06-21 NOTE — SLP NOTE
SPEECH DAILY NOTE - INPATIENT    Evaluation Date: 06/21/2017    ASSESSMENT & PLAN   ASSESSMENT  Pt seen for dysphagia tx to assess tolerance with recommended diet, ensure proper utilization of aspiration precautions and provide pt/family education.   No fam patient will utilize compensatory strategies as outlined by  VFSS including Slow rate, Small bites, Small sips, Multiple swallows, Alternate liquids/solids, Upright 90 degrees, Eliminate distractions with mild assistance 100 % of the time across 2 sessions

## 2017-06-21 NOTE — PLAN OF CARE
Problem: NEUROLOGICAL - ADULT  Goal: Achieves maximal functionality and self care  INTERVENTIONS  - Monitor swallowing and airway patency with patient fatigue and changes in neurological status  - Encourage and assist patient to increase activity and self as appropriate  - Instruct patient on fluid and nutrition restrictions as appropriate   Outcome: Progressing

## 2017-06-21 NOTE — PROGRESS NOTES
[address] [place] [address]  TEL: [phone]  FAX: [phone]    Iván Diaz Patient Status:  Inpatient    10/17/1927 MRN [id number]   [facility] 2NE-A Attending Deon Sung, [address] [facility] Day # 7 PCP Yuliet Mg She was felt to probably have an infected pacemaker lead back then. Also has severe AS   -This time has more prominent R hydronephrosis per CT but Ucx w/o sign growth, felt to be due to retention as resolved after castro.               -No other obvious sour

## 2017-06-21 NOTE — PROGRESS NOTES
Renal ultrasound from 6/16/17:  FINDINGS:       RIGHT KIDNEY  MEASUREMENTS:  10.0 x 3.6 x 5.1 cm  ECHOGENICITY:  Normal.  HYDRONEPHROSIS:  None. Extrarenal pelvis.   CYSTS/STONES/MASSES:  Cyst in the lower pole measuring 13 x 13 x 13 mm.      LEFT KIDNEY

## 2017-06-21 NOTE — CM/SW NOTE
Received [facility]IN response from [address] stating they are able to accept patient upon discharge pending bed availability.

## 2017-06-21 NOTE — PROGRESS NOTES
[facility] Hospitalist Progress Note                                                                   Marissa HARTMAN 38.  10/17/1927    CC: F/U AMS    SUBJECTIVE:  Pt denies new complaints.  She den Problems:    Hyponatremia    Hypokalemia    Dehydration    Thrombocytopenia (HCC)    Anemia    Leukocytosis    Hypotension    Elevated troponin    Hypomagnesemia      Patient is a 80year old female with PMH sig for HTN, dementia, syncope, CAD, PAD, HL, an

## 2017-06-21 NOTE — PROGRESS NOTES
[facility] Cardiology Progress Note - Gabriella Ramirez Patient Status:  Inpatient    10/17/1927 MRN [id number]   [facility] 2NE-A Attending Andria Decker, [address] [facility] Day # 7 PCP Elijah Ferguson MD     Subjective: hour   Intake    590 ml   Output    650 ml   Net    -60 ml       Last 3 Weights  06/21/17 0356 : 120 lb (54.432 kg)  06/20/17 0357 : 118 lb 9.7 oz (53.8 kg)  06/19/17 0500 : 122 lb 5.7 oz (55.5 kg)  06/18/17 0408 : 122 lb 12.7 oz (55.7 kg)  06/14/17 2126 : ALPHOS, TBIL, DBIL, TPROT    No results for input(s): TROP in the last 72 hours.     Allergies:   No Known Allergies    Medications:    Current Facility-Administered Medications:  furosemide (LASIX) tab 20 mg 20 mg Oral Daily   carvedilol (COREG) tab 3.125

## 2017-06-22 NOTE — PROGRESS NOTES
[address] [place] [address]  TEL: [phone]  FAX: [phone]    Nicanor Primrose Patient Status:  Inpatient    10/17/1927 MRN [id number]   [facility] 2NE-A Attending Angela Painting, [address] [facility] Day # 8 PCP Aly Pulliam infected pacemaker lead back then. Also has severe AS   -This time has more prominent R hydronephrosis per CT but Ucx w/o sign growth, felt to be due to retention as resolved after castro.             -No other obvious source on exam or per history.  LFTs h

## 2017-06-22 NOTE — CM/SW NOTE
Case discussed with RN. Family has decided to proceed with PICC line. SALOMON spoke with patient's daughter, Nazario Elmore ([phone]) to confirm their first choice is [facility].   SALOMON spoke with admissions at [address] [place] [address] ([phone]

## 2017-06-22 NOTE — DISCHARGE SUMMARY
General Medicine Discharge Summary     Patient ID:  Kailey Ace  80year old  10/17/1927    Admit date: 6/14/2017    Discharge date and time: 6/22/17    Attending Physician: Jaime Caceres DO -Improved with IV lasix  -speech eval- pt has h/o dysphagia - video swallow done now on regular diet (softer) with thin liquid no straws  -pt not really c/o respiratory symptoms, discussed with ID, no changes planned to abx    #Moderate hydronephrosis  -Ur Cholecalciferol (VITAMIN D) 2000 UNITS Oral Cap  Take 2,000 Units by mouth daily. Calcium Carbonate Antacid (MIGDALIA-GEST ANTACID) 500 MG Oral Chew Tab  Chew 1 tablet by mouth 2 (two) times daily.       Pravastatin Sodium 20 MG Oral Tab  Take 20 mg by

## 2017-06-22 NOTE — PROGRESS NOTES
[facility] Cardiology Progress Note - Berhane Better Patient Status:  Inpatient    10/17/1927 MRN [id number]   [facility] 2NE-A Attending Maggie Tovar, [address] [facility] [address] Day # 8 PCP Essence Barnes MD     Subjective: Hypotension, unspecified hypotension type     Elevated troponin     Hypomagnesemia      Objective:   Temp: 98.2 °F (36.8 °C)  Pulse: 90  Resp: 18  BP: 144/39 mmHg    Intake/Output:     Intake/Output Summary (Last 24 hours) at 06/22/17 6633  Last data file NA  139   --    K  3.8  4.3   CL  107   --    CO2  24.0   --    BUN  10   --    CREATSERUM  0.57   --    CA  8.2*   --    GLU  86   --        No results for input(s): ALT, AST, ALB, AMYLASE, LIPASE, LDH in the last 72 hours.     Invalid input(s): ZEENAT

## 2017-06-22 NOTE — PLAN OF CARE
Pt discharged to [facility]. IV removed, tele dc'd and returned to monitor tech. F/U instructions provided and discussed. Pt and family v/u. Rx given. Discussed adverse reactions and side effects of all new medication and provided appropriate handouts.  Pt and

## 2017-06-23 NOTE — CM/SW NOTE
06/23/17 0800   Discharge disposition   Discharged to: Skilled Nurs   Name of [id number] [address]   Patient is Discharged to a [address] [place] [address] Pool Yes   Discharge transportation [employer]

## 2017-06-23 NOTE — PROGRESS NOTES
Belva Cogan  : 10/17/1927  Age 80year old  female patient is admitted to Facility: [address] [place] [address] [address]idence for bacteremia     [address] [place] [address] Drive date:  17  Discharge date to [facility]:  17   DAISY:  Recommended Al but on IV antibiotic • Dysphagia    • Problems with swallowing    • Dementia    • Pulmonary embolism (HCC)    • Bacteremia    • Angina pectoris [facility])    • Peripheral vascular disease ([facility])    • COPD (chronic obstructive pulmonary disease) ([facility])    • Depression    • Anxiety s nightly.  Disp:  Rfl:        VITALS:  /82 mmHg  Pulse 98  Temp(Src) 98.2 °F (36.8 °C)  Resp 20  SpO2 95%     REVIEW OF SYSTEMS:  GENERAL HEALTH:feels well otherwise  SKIN: denies any unusual skin lesions or rashes  WOUNDS: None reported   EYES:no visu synovitis upper or lower extremity  EXTREMITIES/VASCULAR:no cyanosis, clubbing   1++ Lower extremity edema/ Neg conrad sign   NEUROLOGIC: intact; no sensorimotor deficit  PSYCHIATRIC: alert and oriented x 2/ intermittently confused ; affect appropriate EKG  2. State IV Lasix 20 mg now   3. State troponin, bnp, labs not drawn yesterday ordered cbc/cmp/ mag/ esr/ sed as well to be drawn weekly   4. Vital signs q 4 hrs x 24 hrs then q shift      Bacteremia   1. IV ceftriaxone 2 gm daily until 7/27/17   2.  Jenifer Hunt

## 2017-06-24 PROBLEM — I50.9 ACUTE ON CHRONIC CONGESTIVE HEART FAILURE (HCC): Status: ACTIVE | Noted: 2017-01-01

## 2017-06-24 PROBLEM — R06.03 RESPIRATORY DISTRESS: Status: ACTIVE | Noted: 2017-01-01

## 2017-06-24 PROBLEM — I50.9 ACUTE ON CHRONIC CONGESTIVE HEART FAILURE, UNSPECIFIED CONGESTIVE HEART FAILURE TYPE: Status: ACTIVE | Noted: 2017-01-01

## 2017-06-24 PROBLEM — R94.31 ABNORMAL EKG: Status: ACTIVE | Noted: 2017-01-01

## 2017-06-24 NOTE — RESPIRATORY THERAPY NOTE
PT CAME IN FOR RESP DISTRESS. BREATHING IN THE HIGH 30's USING ACCESSORY MUSCLES. IMMEDIATELY PUT ON BPAP AND PT BREATHING STATUS IMPROVED. ABG 30 MIN LATER WAS NORMAL. BS COARSE/WHEEZING. 1 CONT WAS GIVEN. BS EXTREMELY IMPROVED AFTER.  GAVE PT SLIGHT BREAK

## 2017-06-24 NOTE — CONSULTS
Marissa [address] Patient Status:  Inpatient    10/17/1927 MRN [id number]   [facility] 6NE-A Attending Tali Rea MD   Hosp Day # 0 PCP Beth Redmond MD     Date of Admission: 2017  Admission Diagnosis: Abn Medications:    Outpatient Prescriptions Marked as Taking for the 6/24/17 encounter [facility] Encounter):  carvedilol 3.125 MG Oral Tab Take 1 tablet (3.125 mg total) by mouth 2 (two) times daily with meals.  Disp: 60 tablet Rfl: 3   lisinopril 5 MG Oral Ta injection 20 mg, 20 mg, Intravenous, BID (Diuretic)  •  aspirin tab 325 mg, 325 mg, Oral, Daily  •  nitroGLYCERIN (NITROSTAT) SL tab 0.4 mg, 0.4 mg, Sublingual, Q5 Min PRN     REVIEW OF SYSTEMS:   GENERAL: above   SKIN:  denies any unusual skin lesions   E ASSESSMENT/PLAN:  1. Hypoxia - due to pulmonary edema.   Responding great to diuresis and able to wean off BiPAP rapidly and now just on minimal O2  -no signs of asthma exacerbation  -no current concerns for pneumonia  -ongoing management per cardiology

## 2017-06-24 NOTE — ED PROVIDER NOTES
Patient Seen in: [facility] 6ne-a    History   No chief complaint on file. Stated Complaint: DIFFICULTY BREATHING    HPI            Patient presents with respiratory distress from the nursing home with reported abnormal EKG and elevated troponin. mouth daily. docusate sodium 100 MG Oral Cap,  Take 1 capsule (100 mg total) by mouth daily as needed for constipation. Cholecalciferol (VITAMIN D) 2000 UNITS Oral Cap,  Take 2,000 Units by mouth daily.      Calcium Carbonate Antacid (MIGDALIA-GEST ANTACID) Chest: clear breath sounds with wheezes bilaterally, rales at the bases    Heart: Regular rate and rhythm     Abdomen: Soft, nondistended,  No tenderness     Back: No costovertebral angle tenderness.      Extremities: Warm, well perfused, with trace biped Reviewed: 5/30/2017          ICD-10-CM Noted    * (Principal)Acute on chronic congestive heart failure, unspecified congestive heart failure type ([facility] [address] I50.9 6/24/2017    Abnormal EKG R94.31 6/24/2017    Acute on chronic congestive heart failure (HCC) I50.

## 2017-06-24 NOTE — SLP NOTE
ADULT SWALLOWING EVALUATION    ASSESSMENT & PLAN   ASSESSMENT  Pt seen for bedside swallow evaluation due to admission on soft diet, thin liquids. RN reports pt has demonstrated no difficulty with diet or oral medications.  Pt received sitting up in bed, ea Peripheral vascular disease ([facility] [address]    • Problems with swallowing    • Pulmonary embolism (HCC)    • Syncope    • Valvular disease        Prior Living Situation: Skilled nursing facility  Diet Prior to Admission: Mechanical soft chopped; Thin liquids  Precaut mechanical soft consistency and thin liquids without overt signs or symptoms of aspiration with 100 % accuracy over 1-2 session(s).    Goal #2 The patient/family/caregiver will demonstrate understanding and implementation of aspiration precautions and swall

## 2017-06-24 NOTE — PLAN OF CARE
At [phone], admitted from ER diagnosed with CHF exacerbation (received Lasix and Solumedrol in ER). On arrival to the floor, dsypneac at rest and coarse on auscultation but patient did verbalized \"I feel better after what they gave me in ER. \" BIPAP re-start

## 2017-06-24 NOTE — H&P
General Medicine H&P     No chief complaint on file. PCP: Katya Crystal MD    History of Present Illness: Patient is a 80year old female with PMH sig for CAD, COPD, depression, dementia, PVD, who p/t [facility] ED c SOB d/t acute CHF.      Hx limited, pt negative except for what's stated above.  \    OBJECTIVE:  /38   Pulse 92   Temp (!) 97.2 °F (36.2 °C) (Temporal)   Resp 20   Wt 118 lb 2.7 oz (53.6 kg)   SpO2 97%   BMI 21.61 kg/m²     Gen: NAD, A+O x 3  Neck: supple, no LAD  CV: rrr, +s1/s2, no murm structures, Doppler spectral analysis, and color flow. Doppler imaging were performed. The following veins were imaged:  Subclavian, Jugular, Axillary, Brachial, Basilic, Cephalic, and the contralateral Subclavian and Jugular.   FINDINGS:  EXTREMITY:  Lef MEASUREMENTS:  10.0 x 3.6 x 5.1 cm ECHOGENICITY:  Normal. HYDRONEPHROSIS:  None. Extrarenal pelvis. CYSTS/STONES/MASSES:  Cyst in the lower pole measuring 13 x 13 x 13 mm.   LEFT KIDNEY MEASUREMENTS:  10.9 x 6.5 x 6.6 cm ECHOGENICITY:  Normal. HYDRONEPHROSI 7:35.  INDICATIONS:  for PICC line placement confirmation  PATIENT STATED HISTORY: PICC confirmation. FINDINGS:  Right-sided tip of PICC in the SVC. There is mild interstitial edema. Left greater than right small pleural effusions.  Heart is normal in si by: Fabrice Martin MD on 6/14/2017 at 10:00     Approved by: Fabrice Martin MD            Ct Abdomen Pelvis Iv Contrast, No Oral (er)    Result Date: 6/14/2017  PROCEDURE:  CT ABDOMEN PELVIS IV CONTRAST, NO ORAL (ER)  COMPARISON:  SAM CT ABDOMEN+PELVIS(CPT=74 Worsening bibasilar infiltrate/atelectasis. Moderate right hydronephrosis. Mild left hydronephrosis with prominent extrarenal pelvis . Distended urinary bladder. Colonic diverticulosis. No small bowel obstruction.    Dictated by: Tara Fonseca MD on 6/1

## 2017-06-24 NOTE — CONSULTS
[facility] Cardiology Consultation    Iván Diaz Patient Status:  Inpatient    10/17/1927 MRN [id number]   [facility] 6NE-A Attending Di Toro MD   Hosp Day # 0 PCP Kymberly Lopez MD     Reason for Consultation:  hypoxia      His tobacco. She reports that she does not drink alcohol or use drugs. Review of Systems:  All systems were reviewed and are negative except as described above in HPI.     Physical Exam:      Temp:  [97 °F (36.1 °C)-97.2 °F (36.2 °C)] 97.2 °F (36.2 °C)  Puls setting of her dementia and chronic illnesses.     Cassius Mccray  6/24/2017  8:43 AM

## 2017-06-24 NOTE — CONSULTS
INFECTIOUS DISEASE CONSULTATION    Sanford Josefina Patient Status:  Inpatient    10/17/1927 MRN [id number]   [facility] 6NE-A Attending Ulices Russell MD   Hosp Day # 0 PCP Julianna Michaels Allergies    Medications:    Current Facility-Administered Medications:   •  albuterol Sulfate (VENTOLIN) (5 MG/ML) 0.5% nebulizer solution 10 mg, 10 mg, Nebulization, Continuous  •  acetaminophen (TYLENOL EXTRA STRENGTH) tab 500 mg, 500 mg, Oral, Q4H PRN 27.6   MCHC  32.4   RDW  14.4   NEPRELIM  13.69*   WBC  17.4*   PLT  431.0       Recent Labs   Lab  06/23/17   1440  06/24/17   0057   GLU  160*  172*   BUN  20  22*   CREATSERUM  0.89  0.96   CA  8.5  8.7   ALB   --   2.2*   NA  137  139   K  4.3  4.4   C

## 2017-06-25 NOTE — PROGRESS NOTES
Continuation from 0230-   I received call from Radiologist at this time giving me the results. The cxr showed increased subcutaneous emphysema to right and left side with no pneumo.  The patients family and  herself were informed that we would closely monit

## 2017-06-25 NOTE — PROGRESS NOTES
Received patient from the day nurse at 2000. Patient alert and oriented times 4. VSS. Denies complaints of pain. Left chest tube patent. Chest tube dressing dry and intact with serous sanguinous drainage noted underneath tape.    Chest tube to 40cm suct not like being talked down to. I apologized and tried to explain but the patient was clearly upset. The charge RN and Nursing Supervisor were notifed and helped change chest tube dressing. Stat chest xray was ordered.  The patient was asking \"Why isn't so

## 2017-06-25 NOTE — PROGRESS NOTES
[facility] Hospitalist Progress Note     PCP: Bruce Menon MD    Chief Complaint: follow-up    Overnight/Interim Events:      SUBJECTIVE:  Pt sitting in chair, asking what's wrong c her. At times conversing appropriately then others she is not.  Reports usi Labs   Lab  06/24/17   0057  06/25/17   0509   TROP  1.070*  1.020*         Meds:     • furosemide  40 mg Intravenous BID (Diuretic)   • carvedilol  3.125 mg Oral BID with meals   • Vitamin D3  2,000 Units Oral Daily   • lisinopril  5 mg Oral Daily   • Pra

## 2017-06-25 NOTE — PLAN OF CARE
CARDIOVASCULAR - ADULT    • Maintains optimal cardiac output and hemodynamic stability Progressing    • Absence of cardiac arrhythmias or at baseline Progressing        Impaired Swallowing    • Minimize aspiration risk Progressing        RESPIRATORY - ADUL

## 2017-06-25 NOTE — PROGRESS NOTES
Received the patient via wheelchair from [address] [facility]. to 8619. Patient is AOx4 but forgetful. Patient assisted to the chair and oriented to the room. Patient's daughter was present. Dinner ordered. 2 L/min NC. Beltran catheter in place. JEN double lumen PICC.  Will

## 2017-06-25 NOTE — PROGRESS NOTES
[place] [address] [place] [address] Cardiology Progress Note        French Walker Patient Status:  Inpatient    10/17/1927 MRN [id number]   [facility] 8NE-A Attending Mando Donohue MD   [facility] Day # 1 PCP MD German Cerda 0057  06/25/17   0510   WBC  17.4*  14.9*   HGB  11.7*  9.8*   PLT  431.0  331.0       Chem:  Recent Labs   Lab  06/23/17   1440  06/24/17   0057 06/25/17   0510   NA  137  139  140   K  4.3  4.4  3.3*   CL  102  100*  103   CO2  25.0  29.0  29.0   BUN  2

## 2017-06-26 NOTE — CM/SW NOTE
06/26/17 1400   CM/SW Referral Data   Referral Source Physician   Reason for Referral Discharge planning;Protocol order set   Specify order set CHF   Informant Patient   Readmission Assessment   Factors that patient feels contributed to this readmission updates sent to [facility]. Pat's    SW to assist with transfer to [facility]. Pat's at ID.     Hyun Irizarry, 06/26/17, 2:51 PM

## 2017-06-26 NOTE — PROGRESS NOTES
[facility]  Progress Note    Saira Garcia Patient Status:  Inpatient    10/17/1927 MRN [id number]   [facility] 8NE-A Attending Cameron Canavan, MD   [facility] Day # 2 PCP Bruce Menon MD     Subjective:  Saira Garcia is a(n) Recent Labs   Lab  06/23/17   1440  06/24/17   0057  06/25/17   0510  06/26/17   0410   NA  137  139  140  140   K  4.3  4.4  3.3*  4.5  4.5   CL  102  100*  103  102   CO2  25.0  29.0  29.0  33.0*   BUN  20  22*  27*  24*   CREATSERUM  0.89  0.96  0 evaluate the upper extremity venous system. B-mode two-dimensional images of the vascular structures, Doppler spectral analysis, and color flow. Doppler imaging were performed.   The following veins were imaged:  Subclavian, Jugular, Axillary, Brachial, Ba kidneys and bladder was performed. Routine technique was utilized. FINDINGS:   RIGHT KIDNEY MEASUREMENTS:  10.0 x 3.6 x 5.1 cm ECHOGENICITY:  Normal. HYDRONEPHROSIS:  None. Extrarenal pelvis.  CYSTS/STONES/MASSES:  Cyst in the lower pole measuring 13 x 1 radiograph was obtained. COMPARISON:  [facility] , XR CHEST PA + LAT CHEST (FWW=13019), 6/19/2017, 7:35. INDICATIONS:  for PICC line placement confirmation  PATIENT STATED HISTORY: PICC confirmation. FINDINGS:  Right-sided tip of PICC in the SVC.  There is CONCLUSION:  No focal consolidation. Stable chest radiograph compared to 1/21/17.    Dictated by: Angélica Greene MD on 6/14/2017 at 10:00     Approved by: Angélica Greene MD            Ct Abdomen Pelvis Iv Contrast, No Oral (er)    Result Date: 6/14/2017  PROCED and multilevel degenerative disc disease. Postsurgical changes right hip. CONCLUSION:  Worsening bibasilar infiltrate/atelectasis. Moderate right hydronephrosis. Mild left hydronephrosis with prominent extrarenal pelvis . Distended urinary bladder. effusions/CHF  -IV lasix  Possible thoracentesis     Recurrent streptococcal sepsis, probably pacemaker lead infxn  -blood cultures 6/14 with Strep mitis in 2/2 bottles, repeat blood cultures NG  -on ceftriaxone via picc, ID following  -not candidate for l

## 2017-06-26 NOTE — PROGRESS NOTES
[facility] Cardiology Progress Note        Porter Willis Patient Status:  Inpatient    10/17/1927 MRN [id number]   [facility] 8NE-A Attending Chris Vasquez MD   [facility] Day # 2 PCP MD Marilou Mckinney 06/24/17   0057  06/25/17   0510   WBC  17.4*  14.9*   HGB  11.7*  9.8*   PLT  431.0  331.0       Chem:  Recent Labs   Lab  06/23/17   1440  06/24/17 0057  06/25/17   0510  06/26/17   0410   NA  137  139  140  140   K  4.3  4.4  3.3*  4.5  4.5   CL  102

## 2017-06-26 NOTE — SLP NOTE
SPEECH DAILY NOTE - INPATIENT    Evaluation Date: 06/26/17    ASSESSMENT & PLAN   ASSESSMENT  Pt seen at bedside this AM for speech therapy services. Pt cooperative, pleasant, and alert. Per RN, pt tolerating diet well with no concerns of aspiration.  RN re Established Goals: 1  Session: 1/1    If you have any questions, please contact TANGELA Ramirez  Pager [phone]

## 2017-06-26 NOTE — PROGRESS NOTES
[address] [facility]  TEL: [phone]  FAX: [phone]    Marleny Kirstin Patient Status:  Inpatient    10/17/1927 MRN [id number]   [facility] 8NE-A Attending Danish Mcgee MD   Hosp Day # 2 PCP Jorge French Technologist)  Congestive heart failure. Pleural effusions. FINDINGS:  Improving congestive heart failure. There is still widespread interstitial edema but alveolar edema has significantly diminished.  Bilateral pleural effusions, moderate in size are no

## 2017-06-26 NOTE — CARDIAC REHAB
Attempted to see patient for HF education. Down at [facility]. Will follow.

## 2017-06-27 NOTE — PLAN OF CARE
Pt states she still feels like she \"can't get enough air\". Pt remains on bi=pap O2 sat 98%. B/p 120/85. V pacing on tele. HR -85. Pt has had 250 out since lasix given @515. Dr. Richard Yoder notified. Orders received for stat portable chest xray.  Xra

## 2017-06-27 NOTE — PHYSICAL THERAPY NOTE
PT consult received. RN requesting to hold at this time. Pt very dyspneic, plan for thoracentesis today. Will continue to follow.

## 2017-06-27 NOTE — PROGRESS NOTES
Mar[facility] [address] Patient Status:  Inpatient    10/17/1927 MRN [id number]   [facility] 8NE-A Attending Dario Gamboa MD   [facility] Day # 3 PCP Carmela Muhammad MD     SUBJECTIVE: asked by Cards to come back and see pt aga atraumatic, no cyanosis or edema       Lab Results  Component Value Date   WBC 11.0 06/27/2017   RBC 3.78 06/27/2017   HGB 10.3 06/27/2017   HCT 32.2 06/27/2017   MCV 85.2 06/27/2017   MCH 27.2 06/27/2017   MCHC 32.0 06/27/2017   RDW 14.2 06/27/2017   PLT

## 2017-06-27 NOTE — PLAN OF CARE
Received bedside report on this Pt. At 1915. Pt. Awake, A&Ox1, pleasant and cooperative, was sitting up in chair. V-Paced on Tele monitor, sats greater than 92% on 4 L Oxygen per NC, denied any pain or distress.   Pt. Transferred back to bed with walker an

## 2017-06-27 NOTE — PROGRESS NOTES
[facility]  Progress Note    Henna Batista Patient Status:  Inpatient    10/17/1927 MRN [id number]   [facility] 8NE-A Attending Hayley Bueno MD   [address] Day # 3 PCP Som Estevez MD     Subjective:  Henna Batista is a(n) 102  100*   CO2  29.0  33.0*  32.0   BUN  27*  24*  23*   CREATSERUM  0.95  0.88  0.80   CA  8.4  8.7  8.4   MG   --    --   2.1   GLU  119*  135*  94       Recent Labs   Lab  06/25/17   0510  06/27/17   0503   ALT  63*  38   AST  27  18   ALB  2.1*  2.1* The following veins were imaged:  Subclavian, Jugular, Axillary, Brachial, Basilic, Cephalic, and the contralateral Subclavian and Jugular. FINDINGS:  EXTREMITY:  Left THROMBI:  None visible.  COMPRESSION:  Normal compressibility, phasicity, and augmentati Extrarenal pelvis. CYSTS/STONES/MASSES:  Cyst in the lower pole measuring 13 x 13 x 13 mm. LEFT KIDNEY MEASUREMENTS:  10.9 x 6.5 x 6.6 cm ECHOGENICITY:  Normal. HYDRONEPHROSIS:  None. Extrarenal pelvis. CYSTS/STONES/MASSES:  None.   BLADDER:  Decompressed PICC confirmation. FINDINGS:  Right-sided tip of PICC in the SVC. There is mild interstitial edema. Left greater than right small pleural effusions. Heart is normal in size. Left-sided cardiac pacemaker. CONCLUSION:   1.   Mild interstitial edema wi Ct Abdomen Pelvis Iv Contrast, No Oral (er)    Result Date: 6/14/2017  PROCEDURE:  CT ABDOMEN PELVIS IV CONTRAST, NO ORAL (ER)  COMPARISON:  SAM CT ABDOMEN+PELVIS(CPT=74176), 1/21/2017, 19:52.   INDICATIONS:  hypotension, fever, abd distension/pain left hydronephrosis with prominent extrarenal pelvis . Distended urinary bladder. Colonic diverticulosis. No small bowel obstruction.    Dictated by: Eva Bradley MD on 6/14/2017 at 13:02     Approved by: Eva Bradley MD               Assessment:  Carlie Song 2/2 bottles, repeat blood cultures NG  -on ceftriaxone via picc, ID following - 6 weeks per ID  -not candidate for lead extraction     # Elevated LFTs--dowtrending, was normal 6/14/17, elevated on admit     #Dementia- cont to reorient  Anxiety-xanax prn, l

## 2017-06-27 NOTE — PROGRESS NOTES
[facility] Cardiology Progress Note        French Walker Patient Status:  Inpatient    10/17/1927 MRN [id number]   [facility] 8NE-A Attending Mando Donohue MD   [facility] Day # 3 PCP MD German Cerda Lab  06/25/17   0510  06/27/17   0503   WBC  14.9*  11.0   HGB  9.8*  10.3*   PLT  331.0  294.0       Chem:  Recent Labs   Lab  06/25/17   0510  06/26/17   0410  06/27/17   0503   NA  140  140  140   K  3.3*  4.5  4.5  3.7   CL  103  102  100*   CO2  29.

## 2017-06-27 NOTE — PLAN OF CARE
Pt anxious,  c/o increasing sob. States she \"can't breathe\" and \"i don't feel good\". PTs lungs diminished , with some fine crackles. . Pt O2  Sat on 4lnc %. Pt placed back on bipap and scheduled lasix dose given early.    B/p 133/36, HR 84. O2 sat

## 2017-06-27 NOTE — PROCEDURES
Marissa Hamilton [address] Patient Status:  Inpatient    10/17/1927 MRN [id number]   [facility] 8NE-A Attending Gordon Salgado MD   [facility] Day # 3 PCP Michelle Santiago MD     Thoracentesis Procedure Note    Consent: Informed cons

## 2017-06-28 NOTE — PROGRESS NOTES
[facility]  Progress Note    Elier Mccarthy Patient Status:  Inpatient    10/17/1927 MRN [id number]   [facility] 8NE-A Attending Angella Pal MD   [address] Day # 4 PCP Dodie Toney MD     Subjective:  Elier Mccarthy is a(n) CREATSERUM  0.88  0.80  0.89   CA  8.7  8.4  8.9   MG   --   2.1   --    GLU  135*  94  99       Recent Labs   Lab  06/27/17   0503   ALT  38   AST  18   ALB  2.1*       No results for input(s): PGLU in the last 72 hours.       Imaging:  Xr Chest Pa + Lat Cephalic, and the contralateral Subclavian and Jugular. FINDINGS:  EXTREMITY:  Left THROMBI:  None visible. COMPRESSION:  Normal compressibility, phasicity, and augmentation of the subclavian, jugular, axillary, brachial, basilic, and cephalic veins.  OTHE LEFT KIDNEY MEASUREMENTS:  10.9 x 6.5 x 6.6 cm ECHOGENICITY:  Normal. HYDRONEPHROSIS:  None. Extrarenal pelvis. CYSTS/STONES/MASSES:  None. BLADDER:  Decompressed by Beltran catheter limiting evaluation. OTHER:  Negative. CONCLUSION:   1.  No hydronephrosi interstitial edema. Left greater than right small pleural effusions. Heart is normal in size. Left-sided cardiac pacemaker. CONCLUSION:   1. Mild interstitial edema with small pleural effusions. 2. Right-sided PICC with tip in SVC.     Dictated by: Krysten Klein CT ABDOMEN PELVIS IV CONTRAST, NO ORAL (ER)  COMPARISON:  SAM , CT ABDOMEN+PELVIS(CPT=74176), 1/21/2017, 19:52.   INDICATIONS:  hypotension, fever, abd distension/pain  TECHNIQUE:  CT scanning was performed from the dome of the diaphragm to the pubic sym Colonic diverticulosis. No small bowel obstruction.    Dictated by: Aguilar Steen MD on 6/14/2017 at 13:02     Approved by: Aguilar Steen MD               Assessment:  Patient Active Problem List:     Coronary artery disease involving native coronary art via picc, ID following - 6 weeks per ID  -not candidate for lead extraction     # Elevated LFTs--dowtrending, was normal 6/14/17, elevated on admit     #Dementia- cont to reorient  Anxiety-xanax prn, low dose, nursing reports inc anxiety with dyspnea.  Chary

## 2017-06-28 NOTE — PROGRESS NOTES
[place] [address] [place] [address] Cardiology Progress Note        Pranav Soto Patient Status:  Inpatient    10/17/1927 MRN [id number]   [facility] 8NE-A Attending Ari Meier MD   [address] [facility] [address] [facility] [address] Day # 4 PCP MD Laureen Patrick EKG:      Echo:      Cardiac Cath:      Labs:  HEM:  Recent Labs   Lab  06/27/17   0503   WBC  11.0   HGB  10.3*   PLT  294.0       Chem:  Recent Labs   Lab  06/26/17   0410  06/27/17   0503  06/28/17   0543   NA  140  140  141   K  4.5  4.5  3.7  4.5

## 2017-06-28 NOTE — PLAN OF CARE
1600 pt returned from thoracentesis. Gauze and tegaderm to right back dry and intact. Will continue to monitor.

## 2017-06-28 NOTE — PROCEDURES
[facility]  Procedure Note    Jackie Bills Patient Status:  Inpatient    10/17/1927 MRN [id number]   [facility] 8NE-A Attending Iman Dubose MD   [facility] Day # 4 PCP Félix Aguilera MD     Procedure: Right thoracentesis    Pre-

## 2017-06-28 NOTE — PROGRESS NOTES
[facility] Patient Status:  Inpatient    10/17/1927 MRN [id number]   [facility] 8NE-A Attending Lizzette Al MD   [address] Day # 4 PCP Ponce Sharma MD     SUBJECTIVE:Pleasant and without complaints.   Feels bet symmetrical, trachea midline and thyroid not enlarged, symmetric, no tenderness/mass/nodules  Lungs: rales bibasilar  Heart: S1, S2 normal, 3/6 MAGDIEL murmur is heard throughout the precordium  Abdomen: soft, non-tender; bowel sounds normal; no masses,  no or

## 2017-06-28 NOTE — PROGRESS NOTES
[address] [facility]  TEL: [phone]  FAX: [phone]    Andreina Weiss Patient Status:  Inpatient    10/17/1927 MRN [id number]   [facility] 8NE-A Attending Wanetta Lefort, MD   Hosp Day # 4 PCP Deborah Pacheco PLEURAL FLUID Latest Units: % 0   MESOTHELIAL PLEURAL FLUID Latest Units: % 2   PATH COMMENT PLEURAL FLUID Unknown History of conges. ..    GLUCOSE PLEURAL FLUID Latest Units: mg/dL 128   TOTAL PROTEIN PLEURAL FLUID Latest Units: g/dL 1.8   LDH PLEURAL FLUID Will follow.      Jose Restrepo

## 2017-06-28 NOTE — CARDIAC REHAB
Attempted to see for CHF education. Pt unable to comprehend need family for teaching.  inforrmation left at bedside for daughter and bedside Rn informed

## 2017-06-28 NOTE — PHYSICAL THERAPY NOTE
PHYSICAL THERAPY EVALUATION - INPATIENT     Room Number: 4633/7604-W  Evaluation Date: 6/28/2017  Type of Evaluation: Initial  Physician Order: PT Eval and Treat    Presenting Problem: CHF/pna  Reason for Therapy: Mobility Dysfunction and Discharge Alicia Culver: pt lives at [facility] assisted living and was assisted with adls. Reports she amb to dining room with rw for meals.         SUBJECTIVE  \"I am just so tired today\"    Patient self-stated goal is to wake up more and be more alert    OBJECTIVE  P currently need. ..   -   Moving to and from a bed to a chair (including a wheelchair)?: A Little   -   Need to walk in hospital room?: A Little   -   Climbing 3-5 steps with a railing?: A Little       AM-PAC Score:  Raw Score: 18   PT Approx Degree of Impai limitations in independent bed mobility, transfers and gait. The patient is below baseline and would benefit from skilled inpatient PT to address the above deficits to assist patient in returning to prior level of function.  Based on this evaluation, elva

## 2017-06-28 NOTE — IMAGING NOTE
Pt down from nursing unit for R thoracentesis. Pleasantly confused. Tolerated procedure well and post procedure vitals reported to Sol Tirado. 650 ml pale yellow fluid drained. Tegaderm dressing applied.  Post procedure CXR completed and pt taken back to nurs

## 2017-06-29 NOTE — PLAN OF CARE
Assumed care of patient around 0480 66 01 75, alert and oriented X2, no c/o CP/SOB, SpO2 96 % on RA 2L  Rhythm/HR: V paced  Beltran in place: draining well  Right arm-DL picc- purple line draws back, red only flushes    Resting well, will continue to monitor    CARDI

## 2017-06-29 NOTE — PROGRESS NOTES
[place] [address] [place] [address] Cardiology Progress Note        Nikolas Roque Patient Status:  Inpatient    10/17/1927 MRN [id number]   [facility] 8NE-A Attending Tali Rea MD   [facility] Day # 5 PCP Beth Redmond MD     [facility] deficits  Skin: Warm and dry.      Telemetry:     EKG:      Echo:      Cardiac Cath:      Labs:  HEM:  Recent Labs   Lab  06/27/17   0503   WBC  11.0   HGB  10.3*   PLT  294.0       Chem:  Recent Labs   Lab  06/27/17   0503  06/28/17   0543  06/29/17   2588

## 2017-06-29 NOTE — PROGRESS NOTES
[address] [facility]  TEL: [phone]  FAX: [phone]    [facility]:  [facility]    10/17/1927 MRN [id number]   [facility] 8NE-A Attending Jesse Hernandez MD   Hosp Day # 5 PCP Ho Cordova Latest Units: % 2   PATH COMMENT PLEURAL FLUID Unknown History of conges. ..    GLUCOSE PLEURAL FLUID Latest Units: mg/dL 128   TOTAL PROTEIN PLEURAL FLUID Latest Units: g/dL 1.8   LDH PLEURAL FLUID Latest Units: U/L 99   TOTAL CELLS COUNTED Unknown 100 staff. Await palliative care eval.   Will follow.      [facility]

## 2017-06-29 NOTE — PROGRESS NOTES
Mar[facility] [address] Patient Status:  Inpatient    10/17/1927 MRN [id number]   [facility] 8NE-A Attending Wanetta Lefort, MD   [facility] Day # 5 PCP Sanford Chamberlain MD     SUBJECTIVE: underwent thoracentesis on right yesterday masses,  no organomegaly  Extremities: extremities normal, atraumatic, no cyanosis or edema          Lab Results  Component Value Date    06/29/2017   K 4.2 06/29/2017    06/29/2017   CO2 32.0 06/29/2017   BUN 30 06/29/2017   CREATSERUM 0.96 06

## 2017-06-29 NOTE — PROGRESS NOTES
[facility]  Progress Note    Erika Sutton Patient Status:  Inpatient    10/17/1927 MRN [id number]   [facility] 8NE-A Attending Shara Souza MD   [facility] Day # 5 PCP Lucía Bowers MD     Subjective:  Erika Sutton is a(n) 0543  06/29/17   0436   NA  140  141  139   K  3.7  4.5  4.2   CL  100*  103  102   CO2  32.0  33.0*  32.0   BUN  23*  25*  30*   CREATSERUM  0.80  0.89  0.96   CA  8.4  8.9  8.3   MG  2.1   --    --    GLU  94  99  96       Recent Labs   Lab  06/27/17   0 flow.  Doppler imaging were performed. The following veins were imaged:  Subclavian, Jugular, Axillary, Brachial, Basilic, Cephalic, and the contralateral Subclavian and Jugular. FINDINGS:  EXTREMITY:  Left THROMBI:  None visible.  COMPRESSION:  Normal co Normal. HYDRONEPHROSIS:  None. Extrarenal pelvis. CYSTS/STONES/MASSES:  Cyst in the lower pole measuring 13 x 13 x 13 mm. LEFT KIDNEY MEASUREMENTS:  10.9 x 6.5 x 6.6 cm ECHOGENICITY:  Normal. HYDRONEPHROSIS:  None. Extrarenal pelvis.  CYSTS/STONES/MASSES: confirmation  PATIENT STATED HISTORY: PICC confirmation. FINDINGS:  Right-sided tip of PICC in the SVC. There is mild interstitial edema. Left greater than right small pleural effusions. Heart is normal in size. Left-sided cardiac pacemaker.       Nancy Odom Approved by: Fabrice Martin MD            Ct Abdomen Pelvis Iv Contrast, No Oral (er)    Result Date: 6/14/2017  PROCEDURE:  CT ABDOMEN PELVIS IV CONTRAST, NO ORAL (ER)  COMPARISON:  SAM CT ABDOMEN+PELVIS(CPT=74176), 1/21/2017, 19:52.   INDICATIONS:  hypot Moderate right hydronephrosis. Mild left hydronephrosis with prominent extrarenal pelvis . Distended urinary bladder. Colonic diverticulosis. No small bowel obstruction.    Dictated by: Sridhar Roach MD on 6/14/2017 at 13:02     Approved by: Sridhar Roach po     Recurrent streptococcal sepsis, probably pacemaker lead infxn  -blood cultures 6/14 with Strep mitis in 2/2 bottles, repeat blood cultures NG  -on ceftriaxone via picc, ID following - 6 weeks per ID  -not candidate for lead extraction     # Elevated

## 2017-06-29 NOTE — PLAN OF CARE
Dr Emily Schwartz called back aware of low bp. Will monitor pt.no new orders  Pt DNR and and palliative care consult.

## 2017-06-30 NOTE — PROGRESS NOTES
[facility] Cardiology Progress Note        Lanwandy Isaacs Patient Status:  Inpatient    10/17/1927 MRN [id number]   [facility] 8NE-A Attending Yadi Andres MD   [facility] Day # 6 PCP MD Danyelle Temple and dry. Telemetry:     EKG:      Echo:      Cardiac Cath:      Labs:  HEM:  No results for input(s): WBC, HGB, PLT, BANDSPCT, LYMPHOPCT, MONOPCT in the last 72 hours.     Invalid input(s): NEUTOPHILPCT, EOSPCT    Chem:  Recent Labs   Lab  06/28/17   05

## 2017-06-30 NOTE — PROGRESS NOTES
[facility]  TEL: [phone]  FAX: [phone]    Bogdan Torrez Patient Status:  Inpatient    10/17/1927 MRN [id number]   [facility] 8NE-A Attending Ursula Portillo MD   Hosp Day # 6 PCP Jordan Maier FLUID Latest Units: g/dL 1.8   LDH PLEURAL FLUID Latest Units: U/L 99   TOTAL CELLS COUNTED Unknown 100     Pleural fluid pH 7.5    Microbiology    Reviewed in EMR,   Bcx neg to date  Pleural fluid cx  neg    Radiology:     reviewed      A/P    1) JOSIAH wright

## 2017-06-30 NOTE — PLAN OF CARE
Plan is for discharge to [facility]. Pat's today. VSS. Tele V paced. Complains of fatigue and not sleeping at night. REYNA with coughing,. Has difficulty bringing up thick clear secretions. Daughter at bedside and feels mother would rest better at [address] [place] [address].  Awaiting

## 2017-06-30 NOTE — PROGRESS NOTES
Dr. Araceli Yuan updated on pt status. States is very fatigued, did not eat breakfast, denies pain. Productive cough of mod amts thick clear sputum, RR increases to 32 when trying to cough up secretions but returns to baseline in 30 minutes.  Will continue to [facility]

## 2017-06-30 NOTE — PLAN OF CARE
Problem: Impaired Swallowing  Goal: Minimize aspiration risk  Interventions:  - Patient should be alert and upright for all feedings (90 degrees preferred)  - Offer food and liquids at a slow rate  - No straws  - Encourage small bites of food and small sip cough, deep breathe, Incentive Spirometry  - Assess the need for suctioning and perform as needed  - Assess and instruct to report SOB or any respiratory difficulty  - Respiratory Therapy support as indicated  - Manage/alleviate anxiety  - Monitor for sign

## 2017-06-30 NOTE — CM/SW NOTE
Case discussed in rounds. Pt to d/c back to [address] today. SALOMON notified David Vee at [address] of same. She requested that unit RN call [place] with report before 5pm at [phone]. SALOMON arranged 6pm ambulance through [facility].

## 2017-06-30 NOTE — CONSULTS
Palliative Care Consultation:  Received order for palliative care consultation. Reviewed chart. Patient's daughter has declined palliative consultation. Will sign off care. Please re-consult if needed. Thank you.   ASHLEE Dominguez  Palliative Car

## 2017-06-30 NOTE — PROGRESS NOTES
[facility]  Progress Note    Pranav Soto Patient Status:  Inpatient    10/17/1927 MRN [id number]   [facility] 8NE-A Attending Ari Meier MD   [facility] # 6 PCP Matheus Childs MD     Subjective:  Pranav Soto is a(n) 8.3  8.6   GLU  99  96  100*       No results for input(s): ALT, AST, ALB, AMYLASE, LIPASE, LDH in the last 72 hours. Invalid input(s): ALPHOS, TBIL, DBIL, TPROT    No results for input(s): PGLU in the last 72 hours.       Imaging:  Xr Chest Pa + Lat Ch and the contralateral Subclavian and Jugular. FINDINGS:  EXTREMITY:  Left THROMBI:  None visible. COMPRESSION:  Normal compressibility, phasicity, and augmentation of the subclavian, jugular, axillary, brachial, basilic, and cephalic veins.  OTHER:  Janeal Stands KIDNEY MEASUREMENTS:  10.9 x 6.5 x 6.6 cm ECHOGENICITY:  Normal. HYDRONEPHROSIS:  None. Extrarenal pelvis. CYSTS/STONES/MASSES:  None. BLADDER:  Decompressed by Beltran catheter limiting evaluation. OTHER:  Negative. CONCLUSION:   1. No hydronephrosis.  Bi edema. Left greater than right small pleural effusions. Heart is normal in size. Left-sided cardiac pacemaker. CONCLUSION:   1. Mild interstitial edema with small pleural effusions. 2. Right-sided PICC with tip in SVC.     Dictated by: Rochelle Lara, PELVIS IV CONTRAST, NO ORAL (ER)  COMPARISON:  [facility] , CT ABDOMEN+PELVIS(CPT=74176), 1/21/2017, 19:52.   INDICATIONS:  hypotension, fever, abd distension/pain  TECHNIQUE:  CT scanning was performed from the dome of the diaphragm to the pubic symphysis with diverticulosis. No small bowel obstruction.    Dictated by: Jatin Kate MD on 6/14/2017 at 13:02     Approved by: Jatin Kate MD               Assessment:  Patient Active Problem List:     Coronary artery disease involving native coronary artery with cultures NG  -on ceftriaxone via picc, ID following - 6 weeks per ID  -not candidate for lead extraction     # Elevated LFTs--dowtrending, was normal 6/14/17, elevated on admit     #Dementia- cont to reorient  Anxiety-xanax prn, low dose, nursing reports i

## 2017-06-30 NOTE — PLAN OF CARE
Assumed care of patient around 1930, alert and oriented to person and place (at times), no c/o CP/SOB, SpO2 99 % on RA  Rhythm/HR: V paced   S/w daughter of plan of care- she does not want the palliative care consult tomorrow.  If MD wants to talk to her th

## 2017-07-05 NOTE — PROGRESS NOTES
Pranav Soto  : 10/17/1927  Age 80year old  female patient is admitted to Facility: [address] [place] [address] 140 Residence for bacteremia     [address] [place] [address] Drive date:  17 2/ bacteremia  Discharge date to [facility]:  17   Re-admission to [facility] history unknown: Yes     Smoking status: Never Smoker                                                              Smokeless tobacco: Never Used                      Alcohol use:  No                ALLERGIES:  No Known Allergies    CODE STATUS:  DNR    ADVA rashes  WOUNDS: None reported   EYES:no visual complaints or deficits  HENT: denies nasal congestion, sinus pain or sore throat;  RESPIRATORY: denies shortness of breath, wheezing or cough   CARDIOVASCULAR:denies chest pain, no palpitations +CAD +PAD +HTN no sensorimotor deficit  PSYCHIATRIC: alert and oriented x 2/ intermittently confused ; affect appropriate    DIAGNOSTICS REVIEWED AT THIS VISIT:  7.3.17    WBC 12.86  Hgb 10.2  Hct 32.6     Glucose 75  BUN 40  Creat 1.0  GFR  52  Na 142  Potassium 3.5

## 2017-07-06 NOTE — DISCHARGE SUMMARY
General Medicine Discharge Summary     Patient ID:  Nicanor Primrose  80year old  10/17/1927    Admit date: 6/24/2017    Discharge date and time: 6/30/2017  7:00 PM     Attending Physician: Sharon ding.  pro mitis in 2/2 bottles, repeat blood cultures NG  -on ceftriaxone via picc, ID following - 6 weeks per ID, completion will be 7/27  -not candidate for lead extraction     # Elevated LFTs--dowtrending, was normal 6/14/17, elevated on admit     #Dementia- cont capsule (100 mg total) by mouth daily as needed for constipation. , Historical    Loperamide HCl (IMODIUM A-D) 2 MG Oral Tab  Take 2 mg by mouth every 4 (four) hours as needed for Diarrhea.  , Historical    acetaminophen 325 MG Oral Tab  Take 650 mg by mout

## 2017-07-11 NOTE — PROCEDURES
Kailey Ace, 10/17/1927, 80year old, female    Chief Complaint:  Patient presents with:   Follow - Up: strep mitis bacteremia/acute hypoxic respiratory failure and acute on chronic CHF  Anorexia       Subjective:  PMH significant for dementia, dyspha chest  ABDOMEN:  normal active BS+, soft, nondistended; no organomegaly, no masses; no bruits; nontender, no guarding, no rebound tenderness.   :Deferred  LYMPHATIC:no lymphedema  MUSCULOSKELETAL: no acute synovitis upper or lower extremity  EXTREMITIES/V PM

## 2017-07-14 NOTE — PROGRESS NOTES
Erika Sutton, 10/17/1927, 80year old, female at [facility] 316 Complaint:  Patient presents with:  Hypotension: BP 78/34 then re took and was 81/47 RN states this morning it was 99/48        Subjective:    PMH significant for dementia she is okay with fluids for symptom control. Again urged daughter to consider hospice services. PT report ambulating 10-15 ft with RW and mod A and max A for bed mobility and transfers. Poor activity tolerance.        Objective:  BP (!) 87/52   Pulse 10 Hospitalize  8. Recommending hospice - awaiting family decision after discussing with palliative apn      Dehydration  1. Encourage po fluids  2. Hold lasix  3. CMP weekly     Bacteremia   1. IV ceftriaxone 2 gm daily until 7/29/17   2.  Weekly CBC and CMP

## 2018-10-30 NOTE — SLP NOTE
ADULT SWALLOWING EVALUATION    ASSESSMENT & PLAN   ASSESSMENT  B/S swallow eval completed upon receipt of MD order.    HPI  79 YO FEMALE WITH DEMENTIA, HYPERTENSION, SYNCOPE, CAD, peripheral arterial disease, pacemaker, who presents emerged part for altered Agitation    Dementia with behavioral disturbance, unspecified dementia type    Fever, unspecified fever cause    Dehydration      Past Medical History  Past Medical History   Diagnosis Date   • Essential hypertension    • Dementia    • Syncope    • Edema Elevation Strength: Impaired     (Please note: Silent aspiration cannot be evaluated clinically.  Videofluoroscopic Swallow Study is required to rule-out silent aspiration.)    Esophageal Phase of Swallow: No complaints consistent with possible esophageal i Dressing: pressure dressing

## 2022-06-20 NOTE — PLAN OF CARE
CARDIOVASCULAR - ADULT    • Maintains optimal cardiac output and hemodynamic stability Progressing    • Absence of cardiac arrhythmias or at baseline Progressing        Impaired Functional Mobility    • Achieve highest/safest level of mobility/gait Progres The skin at the access site was anesthetized. Using a flashback needle with the Modified Seldinger technique the right radial artery was succesfully accessed times 1 in a retrograde fashion over the guidewire using a Ss Kit 6fr 10cm Flex Straight 0.021in X 45cm.

## (undated) NOTE — LETTER
[facility]  Tesfaye Islas [address]    Consent for Operation    Date: _____6/28/2017______    Time: ___1130____    1. I authorize the performance upon Sunshine Ranks the following operation:    Right thoracentesis    2.  I [place] Smoke videotape. The [facility] will not be responsible for storage or maintenance of this tape. 6. For the purpose of advancing medical education, I consent to the admittance of observers to the Operating Room.     7. I authorize the use of any specimen, organs Signature of Patient:   ___________________________    When the patient is a minor or mentally incompetent to give consent:  Signature of person authorized to consent for patient: ___________________________   Relationship to patient: _____________________ drugs/illegal medications). Failure to inform my anesthesiologist about these medicines may increase my risk of anesthetic complications. · If I am allergic to anything or have had a reaction to anesthesia before.     3. I understand how the anesthesia med I have discussed the procedure and information above with the patient (or patient’s representative) and answered their questions. The patient or their representative has agreed to have anesthesia services.     _______________________________________________

## (undated) NOTE — IP AVS SNAPSHOT
Patient Demographics     Address  [address] [address] Phone  [phone] [facility]) *Preferred*  [phone] [facility])      Emergency Contact(s)     Name Relation Home Work Mobile    Blaine Daughter   [phone]    Pauline Ochoa functioning and strong. Unless instructed not to exercise, you may walk at a slow to moderate pace for 10-15 minutes 2-3 days per week to start. Pace your activity to prevent shortness of breath or fatigue.  Stop exercise if you develop chest pain, lighthea Next dose due:  6/30/17 evening      Take 1 tablet (2.5 mg total) by mouth 2 (two) times daily. ROSS Mahoney         aspirin 325 MG Tabs  Next dose due:  7/1/17      Take 1 tablet (325 mg total) by mouth daily.    Giacomo Honeycutt MD         [facility] [id number] - MAR ACTION REPORT  (last 24 hrs)    ** No medications to display **            Recent Vital Signs    Flowsheet Row Most Recent Value   Vitals  96/31 Filed at 06/30/2017 1824   Pulse  85 Filed at 06/30/2017 1824   Resp  20 Filed at 06/30/20 Author:  David Lewis MD Service:  Hospitalist Author Type:  Physician    Filed:  6/24/2017 12:58 PM Date of Service:  6/24/2017 12:47 PM Status:  Signed    :   David Lewis MD (Physician)       General Medicine H&P     No chief compl Smoking status: Never Smoker    Smokeless tobacco: Never Used    Alcohol use No        Fam Hx  Family History   Problem Relation Age of Onset   • Family history unknown: Yes       Review of Systems  Comprehensive ROS reviewed and negative except for what' Left hand/upper extremity swelling. Left hand and upper limb swelling. TECHNIQUE:  Real time, grey scale, and duplex ultrasound was used to evaluate the upper extremity venous system.  B-mode two-dimensional images of the vascular structures, Doppler spec ABDOMEN PELVIS IV CONTRAST, NO ORAL (ER), 6/14/2017, 12:08. INDICATIONS:  urinary retention, bilateral hydronephrosis  TECHNIQUE:  Transabdominal gray scale ultrasound imaging of the bilateral kidneys and bladder was performed.   Routine technique was util Dictated by: Chana Donladson MD on 6/24/2017 at 8:08     Approved by: Chana Donaldson MD            Xr Chest Ap Portable  (cpt=71010)    Result Date: 6/22/2017  PROCEDURE:  XR CHEST AP PORTABLE (CPT=71010)  TECHNIQUE:  AP chest radiograph was obtained. distension/pain  PATIENT STATED HISTORY: (As transcribed by Technologist)  Patient provided no additional history at this time. FINDINGS:  No focal consolidation, pleural effusion, or pneumothorax. Left-sided pacemaker. Atherosclerotic aortic arch.  Osse bowel is limited by motion. There is a small air-fluid level of the rectum which may be seen with diarrhea ABDOMINAL WALL:  No ventral wall hernia. PELVIC ORGANS:  Distended urinary bladder. Hysterectomy. BONES:  No lytic or destructive process.  Scoliosis Patient will require inpatient services that will reasonably be expected to span two midnight's based on the clinical documentation in H+P.    Based on patients current state of illness, I anticipate that, after discharge, patient will require TBD.[CY.1] • Asthma    • Bacteremia    • CAD (coronary artery disease)    • Carotid artery disease (HCC)    • Congestive heart disease (HCC)    • COPD (chronic obstructive pulmonary disease) ([facility] [address]    • Coronary atherosclerosis    • Dementia    • Dementia    • Depress IVPB-minibag, 2 g, Intravenous, Q24H[ARTHUR.2]    Review of Systems:    A comprehensive 10 point review of systems was completed. Pertinent positives and negatives noted in the the HPI. Physical Exam:    General: No acute distress.  Alert and oriented x 3 Closed intertrochanteric fracture of right femur ([facility] [address]             Global exp 12-16-16 / RIGHT FEMUR / RFM      Plantar fasciitis, right     Screening for cardiovascular condition     Somnolence     Agitation     Dementia with behavioral disturbance, un space disease above the effusions has improved mildly. Stable right PICC with tip in SVC and dual-lead subclavian left-sided pacemaker. No pneumothorax. CONCLUSION:   Improved CHF. Dictated by:  Quinton Carvajal MD on 6/26/2017 at 12:54     Approve ---------------------------------------------------------------------------- Transthoracic Echocardiogram Name:Kathryn Roberson Date: 06/15/2017     :  10/17/1927 Ht:  (62in)  BP: 123 / 64 MRN:  [id number] Age:  89years    Wt:  (120lb) HR: 102bpm moderately increased,    in the range of 45mm Hg to 50mm Hg. Impressions: This study is compared with previous dated 01/23/2017: * ---------------------------------------------------------------------------- * Left ventricle:   Abnormal septal motion likel increased right atrial pressure. ---------------------------------------------------------------------------- Measurements  Left ventricle                                  Value          Reference  LV ID, ED, PLAX chordal                         4.0   cm <3.8   Left atrium                                     Value          Reference  LA ID, A-P, ES                                  3.0   cm       2.7 - 3.8  LA ID/bsa, A-P                                  1.9   cm/m^2   1. 5 - 2.3  LA volume, S through 7/3/2017 11:13 AM)     No notes of this type exist for this encounter. Video Swallow Study Notes     No notes of this type exist for this encounter.          SLP Note - SLP Notes      SLP Note signed by BOLIVAR Vieira at 6/26/2017  9:39 AM  V liquids without overt signs or symptoms of aspiration with 100 % accuracy over 1-2 session(s).  MET   Goal #2 The patient/family/caregiver will demonstrate understanding and implementation of aspiration precautions and swallow strategies independently over

## (undated) NOTE — LETTER
Jaqueline Alfonso [address] [place] [address] E  [place], [address] [facility]    Consent for Operation/ PROCEDURE  Date: ___JUNE 27,2017______                                Time: _______________    1.  I authorize the performance upon Elier Mccarthy the following opera videotape. The [facility] will not be responsible for storage or maintenance of this tape. 6. For the purpose of advancing medical education, I consent to the admittance of observers to the Operating Room.     7. I authorize the use of any specimen, organs Signature of Patient:   ___________________________    When the patient is a minor or mentally incompetent to give consent:  Signature of person authorized to consent for patient: ___________________________   Relationship to patient: _____________________

## (undated) NOTE — IP AVS SNAPSHOT
[address]            (For Outpatient Use Only) Initial Admit Date: 6/24/2017   Inpt/Obs Admit Date: Inpt: 6/24/17 / Obs: N/A   Discharge Date:    Carylon Kawasaki:  [de-identified]   MRN: [de-identified]   CSN: [id number]        ENCOUNTER  Patient Subscriber ID:  Pt Rel to Subscriber:    Hospital Account Financial Class: Medicare    June 30, 2017

## (undated) NOTE — IP AVS SNAPSHOT
[facility] [place] [address] [place], [address] ~ [phone]                Discharge Summary   6/14/2017    Kole Smoke           Admission Information        Provider Department    6/14/2017 EDITH HONEYCUTT DO Eh 2ne-A - medication strength  - how much to take        Take 1 tablet (2.5 mg total) by mouth 2 (two) times daily.     Claudette ELIZALDE                              lisinopril 5 MG Tabs   Commonly known as:  PRINIVILZESTRIL   What changed:    - medication strength  - Please  your prescriptions at the location directed by your doctor or nurse     Bring a paper prescription for each of these medications    - apixaban 2.5 MG Tabs  - carvedilol 3.125 MG Tabs  - lisinopril 5 MG Tabs  - sodium chloride 0.9 % SOLN 100 Recent Hematology Lab Results  (Last 3 results in the past 90 days)    WBC RBC Hemoglobin Hematocrit MCV MCH MCHC RDW Platelet MPV    (01/88/95)  10.9 (06/18/17)  3.75 (L) (06/18/17)  10.3 (L) (06/18/17)  32.1 (L) (06/18/17)  85.6 -- -- -- (06/18/17)  168. - If you are a smoker or have smoked in the last 12 months, we encourage you to explore options for quitting.     - If you have concerns related to behavioral health issues or thoughts of harming yourself, contact [facility] a What to report to your healthcare team: Tolerance of medications, temperature, rash, itching, shortness of breath, chills, nausea, and diarrhea           Blood Pressure and Cardiac Medications     carvedilol 3.125 MG Oral Tab    lisinopril 5 MG Oral Tab Most common side effects:  Depends on the specific medication but generally include: diarrhea, constipation, headache, allergic reaction (itching, rash, hives)   What to report to your healthcare team: Pain, nausea/vomiting, no bowel movement in 2+ days, d

## (undated) NOTE — LETTER
[facility] [address], [address]    Consent for Operation/Procedure    Date: 6/28/2017            Time: 1135    1. I authorize the performance upon Lanwandy Isaacs the following operation:    Right thoracentesis    2.  I Qian Russell videotape. The hospitals will not be responsible for storage or maintenance of this tape. 6. For the purpose of advancing medical education, I consent to the admittance of observers to the Operating Room.     7. I authorize the use of any specimen, organs Signature of Patient:   ___________________________    When the patient is a minor or mentally incompetent to give consent:  Signature of person authorized to consent for patient: ___________________________   Relationship to patient: _____________________ drugs/illegal medications). Failure to inform my anesthesiologist about these medicines may increase my risk of anesthetic complications. · If I am allergic to anything or have had a reaction to anesthesia before.     3. I understand how the anesthesia med I have discussed the procedure and information above with the patient (or patient’s representative) and answered their questions. The patient or their representative has agreed to have anesthesia services.     _______________________________________________

## (undated) NOTE — LETTER
Jaqueline Alfonso [address] [place] [address]    Consent for PROCEDURE  Date: __________________    Time: _______________    1. I authorize the performance upon Kole Smoke the following operation:    *THORACENTHESIS  2.  I authorize  videotape. The [facility] will not be responsible for storage or maintenance of this tape. 6. For the purpose of advancing medical education, I consent to the admittance of observers to the Operating Room.     7. I authorize the use of any specimen, organs Signature of Patient:   ___________________________    When the patient is a minor or mentally incompetent to give consent:  Signature of person authorized to consent for patient: ___________________________   Relationship to patient: _____________________

## (undated) NOTE — IP AVS SNAPSHOT
Patient Demographics     Address Phone    [address]  [phone] (Home) *Preferred*  [phone] [facility])      Emergency Contact(s)     Name Relation Home Work Mobile    Blaine Daughter   [address] [facility], [place], [facility], Take 1 capsule (100 mg total) by mouth daily as needed for constipation.     Vipul [place], [place]                           furosemide 20 MG Tabs   Last time this was given:  20 mg on 6/22/2017  9:29 AM   Commonly known as:  LASIX        Take 20 mg by mouth daily [id number] Vitamin D3 (VITAMIN D3) cap 2,000 Units 06/22/17 0929 Given      [id number] apixaban (ELIQUIS) tab 2.5 mg 06/21/17 2012 Given      [id number] apixaban (ELIQUIS) tab 2.5 mg 06/22/17 0929 Given      [id number] carvedilol (COREG) tab 3.125 mg 06/21/17 Order Status:  Completed Lab Status:  Final result Updated:  06/20/17 0808    Specimen Information:  Blood from Blood,peripheral      Blood Culture Result No Growth 5 Days     Sputum culture Once [[id number]]     Order Status:  Sent Lab Status:  No result Vancomycin 1  Sensitive                        H&P - H&P Note      H&P by Maggie Tovar DO at 6/14/2017  2:48 PM  Version 1 of 1    Author:  Maggie Tovar DO Service:  (none) Author Type:  Physician    Filed:  6/14/2017  2:59 PM Note Time:  6/14/2 Metoprolol Tartrate 50 MG Oral Tab Take 1 tablet (50 mg total) by mouth 2x Daily(Beta Blocker). acetaminophen 325 MG Oral Tab Take 650 mg by mouth every 4 (four) hours as needed for Pain.      Cholecalciferol (VITAMIN D) 2000 UNITS Oral Cap Take 1 capsule CO2 24.0 06/14/2017    06/14/2017   CA 7.9 06/14/2017   ALB 2.0 06/14/2017   ALKPHO 84 06/14/2017   BILT 0.7 06/14/2017   TP 5.3 06/14/2017   AST 19 06/14/2017   ALT 14 06/14/2017   PTT 39.0 06/14/2017   INR 2.17 06/14/2017   PTP 24.5 06/14/2017   M FINDINGS:  LUNG BASE:  Pleural thickening in the lung bases with bibasilar infiltrates/atelectasis. These have progressed. LIVER:  Mild periportal edema. Homogeneous enhancement. BILIARY:  Gallbladder is contracted No biliary ductal dilatation.  PANCREAS: -On abx for likely UTI    #Dementia- cont to reorient, watch for agitation which she has had in the past  #HL- cont statin  #CAD/HTN- holding bp meds for hypotension  #Trop elevation- cards was consulted, trend  #Hyponatremia-cont IVF, recheck tomorrow  #H give much info, does not recall why she is here or when her symptoms started. She denies sore throat or congestion, cough, CP, abd pain, n/v or diarrhea. No dysuria, hematuria or frequency. No arthralgias, myalgias or rash.   Per ED reports pt was hypotensi •  Pravastatin Sodium (PRAVACHOL) tab 20 mg, 20 mg, Oral, Nightly  •  0.9%  NaCl infusion, , Intravenous, Continuous  •  acetaminophen (TYLENOL) tab 650 mg, 650 mg, Oral, Q6H PRN  •  ondansetron HCl (ZOFRAN) injection 4 mg, 4 mg, Intravenous, Q6H PRN  •  P ALKPHO  94  84   --    AST  19  19   --    ALT  15  14   --    BILT  0.9  0.7   --    TP  6.6  5.3*   --      Recent Labs   Lab  06/14/17   0927   COLORUR  Yellow   CLARITY  Hazy*   SPECGRAVITY  1.012   GLUUR  Negative   BILUR  Negative   KETUR  Negative distension/pain  PATIENT STATED HISTORY: (As transcribed by Technologist)  Patient provided no additional history at this time. FINDINGS:  No focal consolidation, pleural effusion, or pneumothorax. Left-sided pacemaker. Atherosclerotic aortic arch.  Osse bowel is limited by motion. There is a small air-fluid level of the rectum which may be seen with diarrhea ABDOMINAL WALL:  No ventral wall hernia. PELVIC ORGANS:  Distended urinary bladder. Hysterectomy. BONES:  No lytic or destructive process.  Scoliosis Discharge Summaries - D/C Summary      Discharge Summaries by Messi Tenorio DO at 6/22/2017  3:14 PM  Version 1 of 1    Author:  Messi Tenorio DO Service:  (none) Author Type:  Physician    Filed:  6/22/2017  3:16 PM Note Time:  6/22/2017  3:14 PM -CT abdo reviewed - distended bladder and more prominent right hydronephrosis  -Urine culture with mixed gram positive naomy  -DC ceftriaxone and start PCN - cont to complete course per ID recs (picc line placed)  -WBC elevated 19k on admission  -TTE revie Take 20 mg by mouth daily. docusate sodium 100 MG Oral Cap  Take 1 capsule (100 mg total) by mouth daily as needed for constipation.     Loperamide HCl (IMODIUM A-D) 2 MG Oral Tab  Take 2 mg by mouth 4 (four) times daily as needed for Diarrhea.    acetam Filed:  6/20/2017  1:08 PM Note Time:  6/20/2017 11:45 AM Status:  Signed    :  BOLIVAR Palm (SPEECH-LANGUAGE PATHOLOGIST)             ADULT VIDEOFLUOROSCOPIC SWALLOWING STUDY    Admission Date: 6/14/2017  Evaluation Date: 06/20/2017  Molly Rea FINDINGS:    LUNGS:  Patchy airspace opacity at right lung base slightly worsened since most recent study and definitely worsened since 6/14/17. Opacity at the left lung base in the area of the effusion could be due to impressive atelectasis.   CARDIAC:  No Cleared/Reduced with: Secondary swallow (partial clearance)  Laryngeal Penetration:  (none)  Tracheal Aspiration:  (none)         HARD SOLID  Triggered at: Base of tongue  Delay (seconds): 1  Residue Severity, Location: Mild;Valleculae;Pyriform sinuses  Cl implementation of aspiration precautions and swallow strategies independently over 1-2 session(s).    Goal #3 The patient will utilize compensatory strategies as outlined by  VFSS including Slow rate, Small bites, Small sips, Multiple swallows, Alternate li diet/thin liquids was recommended using aspiration precautions outlined below. Upper denture in place with use of denture adhesive for proper fit. Patient was given hard/crunchy solids and thin liquid by cup without s/s aspiration demonstrated.   Patient If you have any questions, please contact Keiry Crespo     Electronically signed by BOLIVAR Luciano on 6/21/2017  3:00 PM      SLP Note by BOLIVAR Luciano at 6/21/2017  2:43 PM  Version 1 of 2    Author:  BOLIVAR Luciano Se Interdisciplinary Communication: Discussed with RN    GOALS  Goal #1  The patient will tolerate regular/soft consistency and thin liquids without overt signs or symptoms of aspiration with 95 % accuracy over 1-2 session(s).  GOAL MET   Goal #2  The patient/

## (undated) NOTE — IP AVS SNAPSHOT
Patient Demographics     Address Phone    [address] [place] [address]  [phone] (Home) *Preferred*      Emergency Contact(s)     Name Relation Home Work Mobile    Blaine Daughter   [phone]      Allergies as of 1/28/2017 Enalapril Maleate 10 MG Tabs   Last time this was given:  20 mg on 1/28/2017 10:11 AM   Commonly known as:  VASOTEC   Next dose due:  1/29        Take 10 mg by mouth daily.                             furosemide 20 MG Tabs   Commonly known as:  LASIX   Nex [phone] - MAR ACTION REPORT  (last 24 hrs)    ** SITE UNKNOWN **     Order ID Medication Name Action Time Action Reason Comments    [id number] AmLODIPine Besylate (NORVASC) tab 5 mg 01/28/17 1011 Given      [id number] CefTRIAXone Sodium (ROCEPHIN) 2 g in s Potassium 3.2 3.6-5.1 mmol/L [facility]            Testing Performed By     Lab - Abbreviation Name Director Address Valid Date Range    [place] [address] [facility] [id number] 02/03/16 1201 - Presen Order Status:  Completed Lab Status:  Final result Updated:  01/24/17 1412    Specimen Information:  Blood from Blood,peripheral      Blood Culture Result Streptococcus viridans (A)      Blood Culture Smear Gram positive cocci in chains       Strep specie In ED, noted to have temp 100.4 and WBC 17.1. CXR clear. Blood cxs sent, pt started on empiric zosyn. Per report, pt's  was recently admitted to [facility] c similar? Now on hospice? They live together in [place].      144/54, gloria in place      Past M Radiology: Ct Brain Or Head ([id number])    1/21/2017  PROCEDURE:  CT OF THE BRAIN WITHOUT CONTRAST  COMPARISON:  None. INDICATIONS:  AMS, combative  TECHNIQUE:  Noncontrast CT scanning is performed through the brain. Dose reduction techniques were used.  THAI enlargement or focal lesion. KIDNEYS:  Mild to moderate right-sided hydronephrosis. No stones seen. ADRENALS:  Normal.  No mass or enlargement. AORTA/VASCULAR:  Normal.  No aneurysm. RETROPERITONEUM:  Normal.  No mass or adenopathy.   BOWEL/MESENTERY:  N 1/21/2017  CONCLUSION:  1. COPD. Correlate with history of pacemaker lead revision.     Dictated by: Kiki Lao MD on 1/21/2017 at 19:36     Approved by: Kiki Lao MD                 ASSESSMENT / PLAN:    80year old female with PMH sig for [facility] Based on patients current state of illness, I anticipate that, after discharge, patient will require TBD.          Electronically signed by Macie Tang MD on 1/22/2017  1:50 PM      H&P by Macie Tang MD at 1/22/2017 12:40 PM  Version 2 of Smoking status: Never Smoker     Smokeless tobacco: Never Used    Alcohol Use: No        Fam Hx  Family History   Problem Relation Age of Onset   • Family history unknown: Yes       Review of Systems  Comprehensive ROS reviewed and negative except for [facility] ischemic changes. 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 SINUSES:           No sign of acute sinusitis. MASTOIDS:          No sign of acute inflammation. SKULL:             No evidence for fracture or osseous abnormality. OTHER:             None. Atelectasis is seen in both lung bases. Pleural thickening is seen at the lung bases left greater than right. OTHER:  The study is very limited due to excessive patient motion and the study being on contrast.      1/21/2017  CONCLUSION:  1.  Study is very l -IVF, empiric zosyn although no clear infectious source thus far  -currently NPO for swallow eval  -haldol given PRN for agitation; if markedly worsens, consider psych eval    HL  -statin when on PO    HTN, CAD s/p LAD diag intervention; junctional rhythm for dementia, HTN, CAD, PAD, who p/t [facility] ED [facility] for increased agitation/AMS. Hx unable to be obtained from patient as she is not answering my questions appropriately. Hx obtained from chart review, d/w RN staff.  Pt has been increasing combative BUN 13 01/21/2017    01/21/2017   K 4.1 01/21/2017    01/21/2017   CO2 24.0 01/21/2017    01/21/2017   CA 8.5 01/21/2017   ALB 2.8 01/21/2017   ALKPHO 87 01/21/2017   BILT 1.0 01/21/2017   TP 6.7 01/21/2017   AST 20 01/21/2017   ALT 15 0 STATED HISTORY:     FINDINGS:  LIVER:  Normal.  No enlargement, atrophy, abnormal density, or significant focal lesion. BILIARY:  Normal.  No visible dilatation or calcification.   PANCREAS:  Normal.  No lesion, fluid collection, ductal dilatation, or atro device identified, the atrial component is projecting in a different direction when compared to the prior study may have been repositioned correlate with history. Lungs are hyperinflated. Chronic interstitial changes are noted.  Mild atelectasis at the left PHYSICIAN Certification of Need for Inpatient Hospitalization - Initial Certification      Patient will require inpatient services that will reasonably be expected to span two midnight's based on the clinical documentation in H+P.      Based on patients cur • Edema      pedal    • CAD (coronary artery disease)    • PAD (peripheral artery disease) (HCC)    • High blood pressure    • High cholesterol    • Carotid artery disease ([address]    • Falls    • Pacemaker    • Dysphagia    • Problems with swallowing    • Tiarra Lyn HEENT: Moist mucous membranes. Extraocular muscles are intact. Neck: No lymphadenopathy. supple, no masses  Pacemaker site intact  Respiratory: Clear to auscultation bilaterally. No wheezes. No rhonchi. Cardiovascular: S1, S2.  Regular rate and rhythm.   Blood Culture FREQ X 2 [[id number]] (Abnormal) Collected: 01/21/17 1909     Order Status: Completed Lab Status: Preliminary result Updated: 01/23/17 4601     Specimen Information: Blood from Blood,peripheral       Blood Culture Result Streptococcus specie to easily treat strep  Replace with PCN + gent for synergy  ECHO  May need to consider pacemaker extraction if fails to clear bacteremia, but she may be pacemaker dependent, alternatively attempt long term ivabx attempt to clear, possible long term supress WEIGHT BEARING RESTRICTION  Weight Bearing Restriction: None                PAIN ASSESSMENT   Rating: Unable to rate  Location: neck  Management Techniques: Activity promotion; Body mechanics;Breathing techniques;Relaxation;Repositioning    BALANCE hand placement and sequencing. Pt with fair seated bal. Min a sit>stand to RW, cues for hand placement. CGA for t/f from bed>chair c RW. Pt encouraged to ambulate in halls, pt refusing 2/2 fatigue. Pt left in chair, needs met. Alarm set.      THERAPEUTIC EX Occupational Therapy Note by Brady Dubose at 1/27/2017 10:52 AM  Version 1 of 1    Author:  Brady Dubose Service:  (none) Author Type:  Occupational Therapist    Filed:  1/27/2017 10:59 AM Note Time:  1/27/2017 10:52 AM Status:  Signed    :  Marialuisa Meade AM-PAC ‘6-Clicks’ Inpatient Daily Activity Short Form  How much help from another person does the patient currently need…  -   Putting on and taking off regular lower body clothing?: A Lot  -   Bathing (including washing, rinsing, drying)?: A Lot  -   Toil decreased endurance, balance, strength and functional mobility. These deficits manifest functionally while performing dressing, bathing and toileting.   The patient is below baseline and would benefit from skilled inpatient OT to address the above deficits 1/21/2017 with somnolence, agitation, dehydration and fever. Pt has a past medical history significant for dementia, PAD, HTN and PAD. See below for detailed past medical/surigcal history.        Problem List  Principal Problem:    Somnolence  Active Proble CMS Modifier (G-Code): CK    FUNCTIONAL TRANSFER ASSESSMENT  Supine to Sit : Moderate assistance  Sit to Stand: Moderate assistance    Skilled Therapy Provided: Pt was found in bed in a semi-supine position. Pt requested to use the bathroom.  Pt more pleasa to return to prior level of function. OT Discharge Recommendations: Assisted living with PT/OT  OT Device Recommendations: TBD    PLAN  OT Treatment Plan: Balance activities; Energy conservation/work simplification techniques;ADL training;Functional tr at onset of session and retain during the session. Patient utilized aspiration precautions of small sips via cup and no straws with greater than 90% consistency with mod cues at onset of session and no further cues as session continued.    Patient alan Electronically signed by BOLIVAR Arciniega on 1/27/2017  1:44 PM            Immunizations     Name Date      INFLUENZA defer-01/28/17     Deferral:       Pneumovax 23 (Lot Mgr) defer-01/28/17     Deferral:         Future Appointments        Provider

## (undated) NOTE — IP AVS SNAPSHOT
[facility] [place] [address] [place], [address] ~ [phone]                Discharge Summary   1/21/2017    [facility]           Admission Information        Provider Department    1/21/2017 Nannette Curiel MD  [id number] CONTINUE taking these medications        Instructions Authorizing Provider    Morning Afternoon Evening As Needed    acetaminophen 325 MG Tabs   Commonly known as:  TYLENOL        Take 650 mg by mouth every 4 (four) hours as needed for Pa HYDROcodone-acetaminophen 5-325 MG Tabs   Commonly known as:  [address] [place] [address]                Where to Get Your Medications      These medications were sent to [facility], [phone] [facility], [place]rey Borges [place] [phone] 4.26 (01/21/17)  12.5 (01/21/17)  36.9 (01/21/17)  86.6 (01/21/17)  29.3 (01/21/17)  33.9  (01/21/17)  201.0       Recent Hematology Lab Results (cont.)  (Last 3 results in the past 90 days)    Neutrophil % Lymphocyte % Monocyte % Eosinophil % Basophil % P - If you don’t have insurance, Sarah Ni has partnered with [facility] to help you get signed up for insurance coverage.   [facility] is a Federal Navigator program that can help with your Affordable Care Act cover What to report to your healthcare team:  Dizziness, nausea, chest pain, weakness, numbness           Water Pills     furosemide 20 MG Oral Tab       Use: Treat high blood pressure, swelling in legs, too much fluid    Most common side effects: Dizziness, lo